# Patient Record
Sex: FEMALE | Race: OTHER | HISPANIC OR LATINO | URBAN - METROPOLITAN AREA
[De-identification: names, ages, dates, MRNs, and addresses within clinical notes are randomized per-mention and may not be internally consistent; named-entity substitution may affect disease eponyms.]

---

## 2018-10-10 ENCOUNTER — EMERGENCY (EMERGENCY)
Facility: HOSPITAL | Age: 53
LOS: 1 days | Discharge: HOME | End: 2018-10-10
Attending: EMERGENCY MEDICINE
Payer: MEDICAID

## 2018-10-10 VITALS
HEIGHT: 67 IN | WEIGHT: 199.96 LBS | DIASTOLIC BLOOD PRESSURE: 96 MMHG | OXYGEN SATURATION: 97 % | SYSTOLIC BLOOD PRESSURE: 142 MMHG | RESPIRATION RATE: 18 BRPM | TEMPERATURE: 98 F | HEART RATE: 72 BPM

## 2018-10-10 PROCEDURE — 99284 EMERGENCY DEPT VISIT MOD MDM: CPT

## 2018-10-10 RX ORDER — KETOROLAC TROMETHAMINE 30 MG/ML
30 SYRINGE (ML) INJECTION ONCE
Qty: 0 | Refills: 0 | Status: DISCONTINUED | OUTPATIENT
Start: 2018-10-10 | End: 2018-10-10

## 2018-10-10 NOTE — ED PROVIDER NOTE - OBJECTIVE STATEMENT
52 y/o F pt with a PMHx of HTN presents to the ED c/o L leg swelling and pain x 2 weeks. Pt reports that she fell on her L leg 2 weeks ago and it was initially bruised and swollen. She had an X-ray done shortly after her fall which she states was unremarkable. Pt says that her leg is no longer bruised but it remains swollen. She was sent her today from therapy to have an MRI done. She explains that her leg feels hotter, and more painful at night. Pt denies fever, chills, chest pain, shortness of breath or any other complaints. NKDA.

## 2018-10-10 NOTE — ED PROVIDER NOTE - MEDICAL DECISION MAKING DETAILS
54 y/o F pt presents with LLE swelling. Will check blood work, give pain medication then reassess. 52 y/o F pt presents with LLE swelling. Will check blood work, give pain medication then reassess.  labs are significant for elevated d-dimer. unable to get US at this time. given dose of lovenox in the ED and toradol with improvement of pain. Had detailed conversation with patient to come back to radiology suit for US tomorrow at 9am for US to confirm DVT and if positive to come back to the ED to see case management to get pt rx approved for blood thinner. pt agrees to this plan and says that she will come back to for US in the morning.

## 2018-10-10 NOTE — ED PROVIDER NOTE - MUSCULOSKELETAL MINIMAL EXAM
LLE with swelling and tenderness to palpation from knee to distal. Extremity is neurvascularly intact. No erythema, induration or skin changes. LLE with swelling and tenderness to palpation from knee to distal. Extremity is neurvascularly intact. No erythema, induration or skin changes. LLE N/V intact

## 2018-10-11 LAB
ALBUMIN SERPL ELPH-MCNC: 3.9 G/DL — SIGNIFICANT CHANGE UP (ref 3.5–5)
ALP SERPL-CCNC: 139 U/L — HIGH (ref 40–120)
ALT FLD-CCNC: 24 U/L DA — SIGNIFICANT CHANGE UP (ref 10–60)
ANION GAP SERPL CALC-SCNC: 6 MMOL/L — SIGNIFICANT CHANGE UP (ref 5–17)
APTT BLD: 30 SEC — SIGNIFICANT CHANGE UP (ref 27.5–37.4)
AST SERPL-CCNC: 25 U/L — SIGNIFICANT CHANGE UP (ref 10–40)
BASOPHILS # BLD AUTO: 0.1 K/UL — SIGNIFICANT CHANGE UP (ref 0–0.2)
BASOPHILS NFR BLD AUTO: 1.2 % — SIGNIFICANT CHANGE UP (ref 0–2)
BILIRUB SERPL-MCNC: 0.3 MG/DL — SIGNIFICANT CHANGE UP (ref 0.2–1.2)
BUN SERPL-MCNC: 19 MG/DL — HIGH (ref 7–18)
CALCIUM SERPL-MCNC: 8.7 MG/DL — SIGNIFICANT CHANGE UP (ref 8.4–10.5)
CHLORIDE SERPL-SCNC: 104 MMOL/L — SIGNIFICANT CHANGE UP (ref 96–108)
CO2 SERPL-SCNC: 28 MMOL/L — SIGNIFICANT CHANGE UP (ref 22–31)
CREAT SERPL-MCNC: 0.63 MG/DL — SIGNIFICANT CHANGE UP (ref 0.5–1.3)
D DIMER BLD IA.RAPID-MCNC: 1345 NG/ML DDU — HIGH
EOSINOPHIL # BLD AUTO: 0.2 K/UL — SIGNIFICANT CHANGE UP (ref 0–0.5)
EOSINOPHIL NFR BLD AUTO: 2.4 % — SIGNIFICANT CHANGE UP (ref 0–6)
GLUCOSE SERPL-MCNC: 92 MG/DL — SIGNIFICANT CHANGE UP (ref 70–99)
HCG SERPL-ACNC: 2 MIU/ML — SIGNIFICANT CHANGE UP
HCT VFR BLD CALC: 39.5 % — SIGNIFICANT CHANGE UP (ref 34.5–45)
HGB BLD-MCNC: 12.6 G/DL — SIGNIFICANT CHANGE UP (ref 11.5–15.5)
INR BLD: 0.96 RATIO — SIGNIFICANT CHANGE UP (ref 0.88–1.16)
LYMPHOCYTES # BLD AUTO: 2.7 K/UL — SIGNIFICANT CHANGE UP (ref 1–3.3)
LYMPHOCYTES # BLD AUTO: 36 % — SIGNIFICANT CHANGE UP (ref 13–44)
MCHC RBC-ENTMCNC: 27.3 PG — SIGNIFICANT CHANGE UP (ref 27–34)
MCHC RBC-ENTMCNC: 32 GM/DL — SIGNIFICANT CHANGE UP (ref 32–36)
MCV RBC AUTO: 85.4 FL — SIGNIFICANT CHANGE UP (ref 80–100)
MONOCYTES # BLD AUTO: 0.4 K/UL — SIGNIFICANT CHANGE UP (ref 0–0.9)
MONOCYTES NFR BLD AUTO: 5.5 % — SIGNIFICANT CHANGE UP (ref 2–14)
NEUTROPHILS # BLD AUTO: 4.1 K/UL — SIGNIFICANT CHANGE UP (ref 1.8–7.4)
NEUTROPHILS NFR BLD AUTO: 54.9 % — SIGNIFICANT CHANGE UP (ref 43–77)
PLATELET # BLD AUTO: 327 K/UL — SIGNIFICANT CHANGE UP (ref 150–400)
POTASSIUM SERPL-MCNC: 3.9 MMOL/L — SIGNIFICANT CHANGE UP (ref 3.5–5.3)
POTASSIUM SERPL-SCNC: 3.9 MMOL/L — SIGNIFICANT CHANGE UP (ref 3.5–5.3)
PROT SERPL-MCNC: 8.3 G/DL — SIGNIFICANT CHANGE UP (ref 6–8.3)
PROTHROM AB SERPL-ACNC: 10.4 SEC — SIGNIFICANT CHANGE UP (ref 9.8–12.7)
RBC # BLD: 4.63 M/UL — SIGNIFICANT CHANGE UP (ref 3.8–5.2)
RBC # FLD: 12.6 % — SIGNIFICANT CHANGE UP (ref 10.3–14.5)
SODIUM SERPL-SCNC: 138 MMOL/L — SIGNIFICANT CHANGE UP (ref 135–145)
WBC # BLD: 7.4 K/UL — SIGNIFICANT CHANGE UP (ref 3.8–10.5)
WBC # FLD AUTO: 7.4 K/UL — SIGNIFICANT CHANGE UP (ref 3.8–10.5)

## 2018-10-11 PROCEDURE — 96372 THER/PROPH/DIAG INJ SC/IM: CPT | Mod: XU

## 2018-10-11 PROCEDURE — 85730 THROMBOPLASTIN TIME PARTIAL: CPT

## 2018-10-11 PROCEDURE — 85379 FIBRIN DEGRADATION QUANT: CPT

## 2018-10-11 PROCEDURE — 84702 CHORIONIC GONADOTROPIN TEST: CPT

## 2018-10-11 PROCEDURE — 96374 THER/PROPH/DIAG INJ IV PUSH: CPT

## 2018-10-11 PROCEDURE — 93971 EXTREMITY STUDY: CPT

## 2018-10-11 PROCEDURE — 99284 EMERGENCY DEPT VISIT MOD MDM: CPT | Mod: 25

## 2018-10-11 PROCEDURE — 85610 PROTHROMBIN TIME: CPT

## 2018-10-11 PROCEDURE — 93971 EXTREMITY STUDY: CPT | Mod: 26,LT

## 2018-10-11 PROCEDURE — 85027 COMPLETE CBC AUTOMATED: CPT

## 2018-10-11 PROCEDURE — 80053 COMPREHEN METABOLIC PANEL: CPT

## 2018-10-11 RX ORDER — ENOXAPARIN SODIUM 100 MG/ML
90 INJECTION SUBCUTANEOUS ONCE
Qty: 0 | Refills: 0 | Status: COMPLETED | OUTPATIENT
Start: 2018-10-11 | End: 2018-10-11

## 2018-10-11 RX ADMIN — Medication 30 MILLIGRAM(S): at 00:31

## 2018-10-11 RX ADMIN — ENOXAPARIN SODIUM 90 MILLIGRAM(S): 100 INJECTION SUBCUTANEOUS at 02:16

## 2018-11-23 NOTE — ED ADULT TRIAGE NOTE - PAIN RATING/NUMBER SCALE (0-10): ACTIVITY
Regarding: Fever- 101.3  ----- Message from Vinicius Macedo sent at 11/22/2018  6:52 PM CST -----  Patient Name: Ember Davalos  Specialist or PCP:Dr. Lissy Siddiqui  Pregnant (If Yes, how long?):NO  Symptoms:Fever-101.3  Call Back #:[phone]  Is the patient’s permanent residence located in [place], [place], or a [facility]? Yes [facility] [address]  Call Center Account #:[id number]       6

## 2019-02-12 NOTE — ED PROVIDER NOTE - CHPI ED SYMPTOMS POS
Patient Instructions by Vern Rodríguez MD at 06/14/17 01:22 PM     Author:  Vern Rodríguez MD Service:  (none) Author Type:  Physician     Filed:  06/14/17 01:24 PM Encounter Date:  6/14/2017 Status:  Signed     :  Vern Rodríguez MD (Physician)            Sinusitis      Sinusitis is a swelling of the lining of the sinuses which is sometimes caused by a bacterial infection.  The sinuses are hollow spaces in the bones of your face.  The sinuses are connected to and drain into the nose.      Acute sinusitis lasts for less than four weeks and chronic sinusitis last for more than 12 weeks.     The most common cause of sinusitis is a virus, such as the common cold.  When you catch a cold the mucus becomes thick and sticky, and doesn't drain well. Bacteria can grow in the mucus trapped in your sinuses.  This can lead to a bacterial sinus infection.    People who suffer with allergies, hay fever, asthma, or who smoke or have a deviated septum or nasal polyp are at increased risk of sinusitis.      Antibiotics can treat bacterial infections, but not viral infections. Having a green or yellow nasal discharge does not necessarily mean you need an antibiotic.  Your doctor may have prescribed and antibiotic if your evaluation indicated a probable bacterial infection.     When appropriate (consider age, medical conditions, current medications and symptoms) decongestants, pain relievers and fever reducers (Tylenol and Motrin for example) and expectorants (Mucinex) can be tried. Use saltwater nasal spray to loosen the mucous.  If antibiotics are used but the symptoms are not improving within 5-7 days you please call  your PCP      Doxycycline as ordered-GERD like side effects discussed and to take 1 hr before or 2 hrs after meals and avoid all intake of calcium near time of pill ingestion. Warned re photosensitivity-use sunscreen and cover up during duration of treatment when out in sun.    Albuterol 2 puffs 4 times  a day for 7 days.      Benzonatate 200mg tid PRN cough. Warned to keep this med away from children or pets because the pills could eaten  and should not be sucked on or chewed.  I recommended that the pills be taken over the sink so that if they fall, it will go down the drain instead of being found by a child or pet.     Patient should immediately recap the bottle.      Diflucan for expectant vaginitis.     Prednisone as ordered. Typical side effects are abdominal pain,irritability, or headache.Stop steroids if significant symptoms and see PCP as soon as possible.     For typical viral illness expect on average 3-4 days of sore throat and fever and 7-10 days of cough.     TO ER if worse with fever, chest pain,  and/or shortness of breath    Additional Educational Resources:  For additional resources regarding your symptoms, diagnosis, or further health information, please visit the Health Resources section on [email or web address] or the Online Health Resources section in [employer].        Revision History        User Key Date/Time User Provider Type Action    > [N/A] 06/14/17 01:24 PM Vern Rodríguez MD Physician Sign             EDEMA/PAIN

## 2020-11-04 ENCOUNTER — INPATIENT (INPATIENT)
Facility: HOSPITAL | Age: 55
LOS: 1 days | Discharge: HOME | DRG: 313 | End: 2020-11-06
Attending: INTERNAL MEDICINE | Admitting: INTERNAL MEDICINE
Payer: MEDICAID

## 2020-11-04 VITALS
DIASTOLIC BLOOD PRESSURE: 88 MMHG | TEMPERATURE: 98 F | RESPIRATION RATE: 18 BRPM | WEIGHT: 205.03 LBS | HEIGHT: 67 IN | OXYGEN SATURATION: 99 % | HEART RATE: 104 BPM | SYSTOLIC BLOOD PRESSURE: 126 MMHG

## 2020-11-04 DIAGNOSIS — R07.9 CHEST PAIN, UNSPECIFIED: ICD-10-CM

## 2020-11-04 PROBLEM — I10 ESSENTIAL (PRIMARY) HYPERTENSION: Chronic | Status: ACTIVE | Noted: 2018-10-11

## 2020-11-04 LAB
ALBUMIN SERPL ELPH-MCNC: 3.8 G/DL — SIGNIFICANT CHANGE UP (ref 3.5–5)
ALP SERPL-CCNC: 178 U/L — HIGH (ref 40–120)
ALT FLD-CCNC: 125 U/L DA — HIGH (ref 10–60)
ANION GAP SERPL CALC-SCNC: 7 MMOL/L — SIGNIFICANT CHANGE UP (ref 5–17)
APPEARANCE UR: CLEAR — SIGNIFICANT CHANGE UP
APTT BLD: 30.9 SEC — SIGNIFICANT CHANGE UP (ref 27.5–35.5)
AST SERPL-CCNC: 49 U/L — HIGH (ref 10–40)
BASOPHILS # BLD AUTO: 0.02 K/UL — SIGNIFICANT CHANGE UP (ref 0–0.2)
BASOPHILS NFR BLD AUTO: 0.2 % — SIGNIFICANT CHANGE UP (ref 0–2)
BILIRUB SERPL-MCNC: 0.5 MG/DL — SIGNIFICANT CHANGE UP (ref 0.2–1.2)
BILIRUB UR-MCNC: NEGATIVE — SIGNIFICANT CHANGE UP
BUN SERPL-MCNC: 17 MG/DL — SIGNIFICANT CHANGE UP (ref 7–18)
CALCIUM SERPL-MCNC: 9.4 MG/DL — SIGNIFICANT CHANGE UP (ref 8.4–10.5)
CHLORIDE SERPL-SCNC: 101 MMOL/L — SIGNIFICANT CHANGE UP (ref 96–108)
CO2 SERPL-SCNC: 29 MMOL/L — SIGNIFICANT CHANGE UP (ref 22–31)
COLOR SPEC: YELLOW — SIGNIFICANT CHANGE UP
CREAT SERPL-MCNC: 0.84 MG/DL — SIGNIFICANT CHANGE UP (ref 0.5–1.3)
DIFF PNL FLD: ABNORMAL
EOSINOPHIL # BLD AUTO: 0.12 K/UL — SIGNIFICANT CHANGE UP (ref 0–0.5)
EOSINOPHIL NFR BLD AUTO: 1.2 % — SIGNIFICANT CHANGE UP (ref 0–6)
ETHANOL SERPL-MCNC: <3 MG/DL — SIGNIFICANT CHANGE UP (ref 0–10)
GLUCOSE SERPL-MCNC: 87 MG/DL — SIGNIFICANT CHANGE UP (ref 70–99)
GLUCOSE UR QL: NEGATIVE — SIGNIFICANT CHANGE UP
HCG SERPL-ACNC: 2 MIU/ML — SIGNIFICANT CHANGE UP
HCG UR QL: NEGATIVE — SIGNIFICANT CHANGE UP
HCT VFR BLD CALC: 41.5 % — SIGNIFICANT CHANGE UP (ref 34.5–45)
HGB BLD-MCNC: 13.4 G/DL — SIGNIFICANT CHANGE UP (ref 11.5–15.5)
IMM GRANULOCYTES NFR BLD AUTO: 0.5 % — SIGNIFICANT CHANGE UP (ref 0–1.5)
INR BLD: 1.09 RATIO — SIGNIFICANT CHANGE UP (ref 0.88–1.16)
KETONES UR-MCNC: NEGATIVE — SIGNIFICANT CHANGE UP
LACTATE SERPL-SCNC: 2.1 MMOL/L — HIGH (ref 0.7–2)
LEUKOCYTE ESTERASE UR-ACNC: ABNORMAL
LYMPHOCYTES # BLD AUTO: 2.07 K/UL — SIGNIFICANT CHANGE UP (ref 1–3.3)
LYMPHOCYTES # BLD AUTO: 21.4 % — SIGNIFICANT CHANGE UP (ref 13–44)
MAGNESIUM SERPL-MCNC: 2.5 MG/DL — SIGNIFICANT CHANGE UP (ref 1.6–2.6)
MCHC RBC-ENTMCNC: 27.5 PG — SIGNIFICANT CHANGE UP (ref 27–34)
MCHC RBC-ENTMCNC: 32.3 GM/DL — SIGNIFICANT CHANGE UP (ref 32–36)
MCV RBC AUTO: 85 FL — SIGNIFICANT CHANGE UP (ref 80–100)
MONOCYTES # BLD AUTO: 0.69 K/UL — SIGNIFICANT CHANGE UP (ref 0–0.9)
MONOCYTES NFR BLD AUTO: 7.1 % — SIGNIFICANT CHANGE UP (ref 2–14)
NEUTROPHILS # BLD AUTO: 6.71 K/UL — SIGNIFICANT CHANGE UP (ref 1.8–7.4)
NEUTROPHILS NFR BLD AUTO: 69.6 % — SIGNIFICANT CHANGE UP (ref 43–77)
NITRITE UR-MCNC: NEGATIVE — SIGNIFICANT CHANGE UP
NRBC # BLD: 0 /100 WBCS — SIGNIFICANT CHANGE UP (ref 0–0)
NT-PROBNP SERPL-SCNC: 17 PG/ML — SIGNIFICANT CHANGE UP (ref 0–125)
PH UR: 6 — SIGNIFICANT CHANGE UP (ref 5–8)
PLATELET # BLD AUTO: 280 K/UL — SIGNIFICANT CHANGE UP (ref 150–400)
POTASSIUM SERPL-MCNC: 3.8 MMOL/L — SIGNIFICANT CHANGE UP (ref 3.5–5.3)
POTASSIUM SERPL-SCNC: 3.8 MMOL/L — SIGNIFICANT CHANGE UP (ref 3.5–5.3)
PROT SERPL-MCNC: 8.2 G/DL — SIGNIFICANT CHANGE UP (ref 6–8.3)
PROT UR-MCNC: NEGATIVE — SIGNIFICANT CHANGE UP
PROTHROM AB SERPL-ACNC: 12.9 SEC — SIGNIFICANT CHANGE UP (ref 10.6–13.6)
RBC # BLD: 4.88 M/UL — SIGNIFICANT CHANGE UP (ref 3.8–5.2)
RBC # FLD: 13.3 % — SIGNIFICANT CHANGE UP (ref 10.3–14.5)
SARS-COV-2 RNA SPEC QL NAA+PROBE: SIGNIFICANT CHANGE UP
SODIUM SERPL-SCNC: 137 MMOL/L — SIGNIFICANT CHANGE UP (ref 135–145)
SP GR SPEC: 1.01 — SIGNIFICANT CHANGE UP (ref 1.01–1.02)
TROPONIN I SERPL-MCNC: <0.015 NG/ML — SIGNIFICANT CHANGE UP (ref 0–0.04)
UROBILINOGEN FLD QL: NEGATIVE — SIGNIFICANT CHANGE UP
WBC # BLD: 9.66 K/UL — SIGNIFICANT CHANGE UP (ref 3.8–10.5)
WBC # FLD AUTO: 9.66 K/UL — SIGNIFICANT CHANGE UP (ref 3.8–10.5)

## 2020-11-04 PROCEDURE — 70450 CT HEAD/BRAIN W/O DYE: CPT | Mod: 26

## 2020-11-04 PROCEDURE — 71046 X-RAY EXAM CHEST 2 VIEWS: CPT | Mod: 26

## 2020-11-04 PROCEDURE — 99285 EMERGENCY DEPT VISIT HI MDM: CPT

## 2020-11-04 RX ORDER — ASPIRIN/CALCIUM CARB/MAGNESIUM 324 MG
81 TABLET ORAL DAILY
Refills: 0 | Status: DISCONTINUED | OUTPATIENT
Start: 2020-11-04 | End: 2020-11-06

## 2020-11-04 RX ORDER — ASPIRIN/CALCIUM CARB/MAGNESIUM 324 MG
325 TABLET ORAL ONCE
Refills: 0 | Status: COMPLETED | OUTPATIENT
Start: 2020-11-04 | End: 2020-11-04

## 2020-11-04 RX ORDER — ATORVASTATIN CALCIUM 80 MG/1
80 TABLET, FILM COATED ORAL AT BEDTIME
Refills: 0 | Status: DISCONTINUED | OUTPATIENT
Start: 2020-11-04 | End: 2020-11-06

## 2020-11-04 RX ORDER — ACETAMINOPHEN 500 MG
650 TABLET ORAL EVERY 6 HOURS
Refills: 0 | Status: DISCONTINUED | OUTPATIENT
Start: 2020-11-04 | End: 2020-11-06

## 2020-11-04 RX ORDER — METOPROLOL TARTRATE 50 MG
12.5 TABLET ORAL
Refills: 0 | Status: DISCONTINUED | OUTPATIENT
Start: 2020-11-04 | End: 2020-11-06

## 2020-11-04 RX ORDER — SODIUM CHLORIDE 9 MG/ML
1000 INJECTION INTRAMUSCULAR; INTRAVENOUS; SUBCUTANEOUS ONCE
Refills: 0 | Status: COMPLETED | OUTPATIENT
Start: 2020-11-04 | End: 2020-11-04

## 2020-11-04 RX ADMIN — Medication 325 MILLIGRAM(S): at 17:40

## 2020-11-04 RX ADMIN — Medication 650 MILLIGRAM(S): at 23:40

## 2020-11-04 RX ADMIN — SODIUM CHLORIDE 1000 MILLILITER(S): 9 INJECTION INTRAMUSCULAR; INTRAVENOUS; SUBCUTANEOUS at 17:50

## 2020-11-04 NOTE — H&P ADULT - PROBLEM SELECTOR PLAN 3
pt says she takes losartan at home  primary team to obtain meds from pharmacy in am  bp WNL for now  started on low dose lopressor  monitor bp

## 2020-11-04 NOTE — H&P ADULT - NSICDXPASTMEDICALHX_GEN_ALL_CORE_FT
PAST MEDICAL HISTORY:  Cerebrovascular accident (CVA)     HTN (hypertension)     Swelling of lower extremity

## 2020-11-04 NOTE — ED PROVIDER NOTE - CLINICAL SUMMARY MEDICAL DECISION MAKING FREE TEXT BOX
Character low suspicion for CVA and no current blurry vision. NIH 0 and out of window and not a candidate for TPA or NI. Character low suspicion for dissection and no murmur or pulse asymmetry. No arrythmia. Character low suspicion for PE and no risk factors for this or hypoxia or e/o DVT. No e/o fluid overload, PTX, or PNA on exam or CXR. Character low suspicion for ACS and ECG/trop _______________, will admit for ACS w/u and concern for TIA. Character low suspicion for CVA and no current blurry vision. NIH 0 and out of window and not a candidate for TPA or NI. Character low suspicion for dissection and no murmur or pulse asymmetry. No arrythmia. Character low suspicion for PE and no risk factors for this or hypoxia or e/o DVT. No e/o fluid overload, PTX, or PNA on exam or CXR. Character low suspicion for ACS and ECG/trop unremarkable, will admit for ACS w/u and concern for TIA. Patient well appearing, hemodynamically stable. Given ASA, fluids. Admitted to internal medicine for further monitoring, w/u, and care.

## 2020-11-04 NOTE — H&P ADULT - NSHPPHYSICALEXAM_GEN_ALL_CORE
General - NAD, sitting up in bed, well groomed  Eyes - PERRLA, EOM intact  ENT - Nonicteric sclerae, PERRLA, EOMI. Oropharynx clear. Moist mucous membranes. Conjunctivae appear well perfused.   Neck - No noticeable or palpable swelling, redness or rash around throat or on face  Lymph Nodes - No lymphadenopathy  Cardiovascular - RRR no m/r/g, no JVD, no carotid bruits  Lungs - Clear to auscltation, no use of acessory muscles, no crackles or wheezes.  Skin - No rashes, skin warm and dry, no erythematous areas  Breast -  Psychiatry -  Abdomen - Normal bowel sounds, abdomen soft and nontender  Genito Urinary – Genital exam not performed since complaints not related.  Rectal – Rectal exam not performed since no symptoms indicated blood loss.  Extremeties - No edema, cyanosis or clubbing  Musculo Skeletal - 5/5 strength, normal range of motion, no swollen or erythematous  joints.  Neurological – Alert and oriented x 3, CN 2-12 grossly intact. General - NAD, sitting up in bed, well groomed  Eyes - PERRLA, EOM intact  ENT - Nonicteric sclerae, PERRLA, EOMI. Oropharynx clear. Moist mucous membranes. Conjunctivae appear well perfused.   Neck - No noticeable or palpable swelling, redness or rash around throat or on face  Lymph Nodes - No lymphadenopathy  Cardiovascular - RRR no m/r/g, no JVD, no carotid bruits  Lungs - Clear to auscultation, no use of accessory muscles, no crackles or wheezes.  Skin - No rashes, skin warm and dry, no erythematous areas  Abdomen - Normal bowel sounds, abdomen soft and nontender  Extremities - LE swelling  Musculo Skeletal -decreased strength   Neurological – Alert and oriented x 3, CN 2-12 grossly intact.

## 2020-11-04 NOTE — ED PROVIDER NOTE - OBJECTIVE STATEMENT
[id number].  55yoF with h/o HTN, ?CVA, presents with CP, SOB, blurry vision. States 2 days ago she began having nonradiating L sided chest pain when she bent over, now today has been constant, improved when she breathes deeply, no other exac/alleviating factors including exertion. Associated constant nonexertional SOB this entire time as well as well as feeling her fingertips are rigid. Also reports 2 25-minute episodes of blurry vision during this time. She reports having a CVA 6 yrs ago consisting of blurry vision and SOB, triggered by an argument she was having, and was admitted to the hospital and told she had a stroke and HTN. States she thinks the past 2 days have been triggered by seeing something at work that she has been thinking about a lot.  [id number].  55yoF with h/o HTN, ?CVA, presents with CP, SOB, blurry vision. States 2 days ago she began having nonradiating L sided chest pain when she bent over, now today has been constant, improved when she breathes deeply, no other exac/alleviating factors including exertion. Associated constant nonexertional SOB this entire time as well as well as feeling her fingertips are rigid. Also reports 2 25-minute episodes of blurry vision during this time. She reports having a CVA 6 yrs ago consisting of blurry vision and SOB, triggered by an argument she was having, and was admitted to the hospital and told she had a stroke and HTN. States she thinks the past 2 days have been triggered by seeing something at work that she has been thinking about a lot. +FHx of ACS at a younger age.

## 2020-11-04 NOTE — ED PROVIDER NOTE - CARE PLAN
Principal Discharge DX:	Chest pain, rule out acute myocardial infarction  Secondary Diagnosis:	Blurry vision

## 2020-11-04 NOTE — ED ADULT NURSE NOTE - ED STAT RN HANDOFF DETAILS
Pt is alert and oriented x3. No neuro deficit noted. No sign of acute distress noted. Cardiac monitor in progress. NSR noted. Pt is transferred to holding in stable condition. Pt is alert and oriented x3. No neuro deficit noted. No sign of acute distress noted. Cardiac monitor in progress. NSR noted. Pérez leg edema noted. Pt is transferred to holding in stable condition.

## 2020-11-04 NOTE — H&P ADULT - HISTORY OF PRESENT ILLNESS
Patient, a 55 year old female from home, with PMH of HTN, stroke 6 years ago and CHF? presents to the ED with complaint of chest pain, shortness of breath and blurry vision. Patient complains of left sided chest pain that started yesterday, throbbing, initially 10 now improved to 7 in intensity after managed by ED, radiating to left shoulder. She took no medicines for the pain. She also complaints of SOB since yesterday that is present at rest and exertion. She can walk 3 blocks without getting short of breath and it is more when using stairs. Endorses orthopnea that improves with pillows. Denies fever, chills, cough, palpitations. Pt has also been having blurry vision which she describes as 'seeing crystals'. She had one episode yesterday lasting 30min and then today for 20-25min. This is associated with headache and dizziness but no blacking out or LOC.  Denies diarrhea, constipation, urinary issues, recent travel history.  She also reports swelling of feet and nausea. Pt states her fingers feel numb and weak.     In ED, pt tachycardiac to 104 on admission. EKG showed NSR. She received 325mg aspirin and 1L NS bolus.  Vital Signs Last 24 Hrs  T(C): 37 (04 Nov 2020 23:38), Max: 37 (04 Nov 2020 23:38)  T(F): 98.6 (04 Nov 2020 23:38), Max: 98.6 (04 Nov 2020 23:38)  HR: 78 (04 Nov 2020 23:38) (78 - 104)  BP: 101/60 (04 Nov 2020 23:38) (101/60 - 126/88)  BP(mean): --  RR: 18 (04 Nov 2020 23:38) (17 - 18)  SpO2: 98% (04 Nov 2020 23:38) (98% - 99%)

## 2020-11-04 NOTE — H&P ADULT - ASSESSMENT
Patient, a 55 year old female from home, with PMH of HTN, stroke 6 years ago and CHF? presents to the ED with complaint of chest pain, shortness of breath and blurry vision since yesterday. EKG showed NSR. T1 negative. CTH showed no acute hemorrhage, mass effect or extra-axial collection, dense bilateral globus pallidus calcification. CXR negative. Lactate 2.1.     Patient admitted to tele for evaluation of chest pain and stroke workup.       Patient does not remember her medications. Pharmacy closed. Morning team to obtain medications from [facility] [phone]. (number placed in chart)

## 2020-11-04 NOTE — ED ADULT NURSE NOTE - OBJECTIVE STATEMENT
pt presents c/o L sided cp, states 2 days ago she began having nonradiating L sided chest pain when she bent over, now today has been constant, improved when she breathes deeply, no other exac/alleviating factors including exertion. Associated constant nonexertional SOB this entire time as well as well as feeling her fingertips are rigid. Also reports 2 25-minute episodes of blurry vision during this time. Denies fever, chills, palpitations, BLE swelling, HA, dizziness, weakness, slurred speech, facial droop.

## 2020-11-04 NOTE — H&P ADULT - PROBLEM SELECTOR PLAN 2
pt also p/w blurry vision episodes along with dizziness  NIHSS 0   pt has h/o stroke so r/o stroke  CTH shows dense bilateral globus pallidus calcification.   start on aspirin and statin   will get neuro eval  f/u echo  f/u carotid doppler  will also get orthostatics

## 2020-11-04 NOTE — H&P ADULT - PROBLEM SELECTOR PLAN 7
[ ] Previous VTE                                    3  [ ] Thrombophilia                                  2  [ ] Lower limb paralysis                        2  (unable to hold up >15 seconds)    [ ] Current Cancer (within 6 months)        2   [x] Immobilization > 24 hrs                    1  [ ] ICU/CCU stay > 24 hrs                      1  [ ] Age > 60                                         1    VTE score:1  start lovenox ppx

## 2020-11-04 NOTE — ED ADULT TRIAGE NOTE - PATIENT/CAREGIVER OFFERED  INTERPRETER SERVICES
Subjective:       Patient ID: Barbara Mims is a 61 y.o. female.    Chief Complaint: Follow-up    Mrs. Mims is a 60 year old female who presents to clinic for follow up on psoriatic arthritis and pain management. She took otezla and it caused leg cramps, new onset dermatitis, looks like open lesions do n ot itch and arms, abd, legs she not been bit,  Stays inside,   And has dogs but are both inside  Dogs without fleas.  Current treatment:  1. Norco 10/325 TID PRN for pain  2. OTC ibuprofen 400 mg daily  3. Prednisone 10 mg daily  4. Gabapentin 300 mg nightly    Review of Systems   Constitutional: Positive for activity change. Negative for appetite change, chills, fatigue and fever.   HENT: Positive for postnasal drip and rhinorrhea.    Eyes: Negative for visual disturbance.   Respiratory: Negative for cough and shortness of breath.    Cardiovascular: Negative for chest pain, palpitations and leg swelling.   Gastrointestinal: Negative for abdominal pain, constipation, diarrhea, nausea and vomiting.   Musculoskeletal: Positive for arthralgias, back pain, joint swelling, myalgias, neck pain and neck stiffness.   Skin: Positive for rash.        Open lesions erythema   Allergic/Immunologic: Positive for environmental allergies.   Neurological: Negative for dizziness, weakness, light-headedness and headaches.         Objective:     Vitals:    05/22/19 1445   BP: (!) 143/97   Pulse: 82       Past Medical History:   Diagnosis Date    Alopecia     Anemia     Anxiety     Arthritis     Cardiac angina syndrome     Chronic kidney disease     kidney stones    Degenerative disc disease     Depression     Dry eyes     Dry mouth     Hyperlipidemia     Hypertension     Kidney stones     Mitral valve prolapse     Thyroid disease      Past Surgical History:   Procedure Laterality Date    CARDIAC CATHETERIZATION      CERVICAL DISC SURGERY      HYSTERECTOMY      LITHOTRIPSY            Physical Exam    Constitutional: She is well-developed, well-nourished, and in no distress.   Eyes: Right conjunctiva is not injected. Left conjunctiva is not injected. Right eye exhibits normal extraocular motion. Left eye exhibits normal extraocular motion.   Neck: No JVD present. No thyromegaly present.   Cardiovascular: Normal rate and regular rhythm.  Exam reveals no decreased pulses.    Pulmonary/Chest: She has no wheezes. She has no rhonchi. She has no rales.       Right Side Rheumatological Exam     Examination finds the shoulder, elbow, wrist, knee, 1st MCP, 2nd MCP, 3rd MCP, 4th MCP and 5th MCP normal.    The patient is tender to palpation of the 1st PIP, 2nd PIP, 3rd PIP, 4th PIP and 5th PIP    She has swelling of the 1st PIP, 2nd PIP, 3rd PIP, 4th PIP and 5th PIP    Left Side Rheumatological Exam     Examination finds the shoulder, elbow, wrist, knee, 1st MCP, 2nd MCP, 3rd MCP, 4th MCP and 5th MCP normal.    The patient is tender to palpation of the 1st PIP, 2nd PIP, 3rd PIP, 4th PIP and 5th PIP.    She has swelling of the 1st PIP, 2nd PIP, 3rd PIP, 4th PIP and 5th PIP      Back/Neck Exam   Tenderness Right paramedian tenderness of the Lower C-Spine and Upper T-Spine.Left paramedian tenderness of the Lower C-Spine and Upper T-Spine.    Neck Range of Motion   Flexion: Limited  Extension: Limited  Right Lateral Bend: abnormal  Left Lateral Bend: abnormal  Right Rotation: abnormal  Left Rotation: abnormal  Lymphadenopathy:     She has no cervical adenopathy.   Neurological: Gait normal.   Skin: No rash noted.     +psoriatic nail changes   Psychiatric: Mood and affect normal.   Musculoskeletal: She exhibits tenderness and deformity.           Results for orders placed or performed in visit on 05/22/19   Sedimentation rate   Result Value Ref Range    Sed Rate 14 0 - 20 mm/Hr         Assessment:       1. Bronchitis    2. Psoriatic arthritis    3. Anxiety disorder, unspecified type    4. Depression, unspecified depression  type    5. Fibromyalgia    6. Raynaud's disease without gangrene    7. Thyroiditis    8. Psoriasis with pustules    9. Weight loss            Plan:       Bronchitis  -     X-Ray Chest PA And Lateral; Future; Expected date: 05/22/2019    Psoriatic arthritis  -     gabapentin (NEURONTIN) 300 MG capsule; Take 1 capsule (300 mg total) by mouth nightly.  Dispense: 30 capsule; Refill: 6  -     ergocalciferol (ERGOCALCIFEROL) 50,000 unit Cap; Take 1 capsule (50,000 Units total) by mouth every 7 days.  Dispense: 4 capsule; Refill: 6  -     levothyroxine (SYNTHROID) 50 MCG tablet; Take 1 tablet (50 mcg total) by mouth before breakfast.  Dispense: 30 tablet; Refill: 6  -     predniSONE (DELTASONE) 5 MG tablet; Take 2 tablets (10 mg total) by mouth daily as needed.  Dispense: 60 tablet; Refill: 6  -     QUEtiapine (SEROQUEL) 50 MG tablet; Take 3 tablets (150 mg total) by mouth every evening.  Dispense: 90 tablet; Refill: 6  -     Discontinue: HYDROcodone-acetaminophen (NORCO)  mg per tablet; Take 1 tablet by mouth every 8 (eight) hours as needed for Pain.  Dispense: 90 tablet; Refill: 0  -     X-Ray Chest PA And Lateral; Future; Expected date: 05/22/2019  -     Discontinue: HYDROcodone-acetaminophen (NORCO)  mg per tablet; Take 1 tablet by mouth every 8 (eight) hours as needed for Pain.  Dispense: 90 tablet; Refill: 0  -     HYDROcodone-acetaminophen (NORCO)  mg per tablet; Take 1 tablet by mouth every 8 (eight) hours as needed for Pain.  Dispense: 90 tablet; Refill: 0    Anxiety disorder, unspecified type  -     gabapentin (NEURONTIN) 300 MG capsule; Take 1 capsule (300 mg total) by mouth nightly.  Dispense: 30 capsule; Refill: 6  -     ergocalciferol (ERGOCALCIFEROL) 50,000 unit Cap; Take 1 capsule (50,000 Units total) by mouth every 7 days.  Dispense: 4 capsule; Refill: 6  -     levothyroxine (SYNTHROID) 50 MCG tablet; Take 1 tablet (50 mcg total) by mouth before breakfast.  Dispense: 30 tablet; Refill:  6  -     predniSONE (DELTASONE) 5 MG tablet; Take 2 tablets (10 mg total) by mouth daily as needed.  Dispense: 60 tablet; Refill: 6  -     QUEtiapine (SEROQUEL) 50 MG tablet; Take 3 tablets (150 mg total) by mouth every evening.  Dispense: 90 tablet; Refill: 6  -     X-Ray Chest PA And Lateral; Future; Expected date: 05/22/2019    Depression, unspecified depression type  -     gabapentin (NEURONTIN) 300 MG capsule; Take 1 capsule (300 mg total) by mouth nightly.  Dispense: 30 capsule; Refill: 6  -     ergocalciferol (ERGOCALCIFEROL) 50,000 unit Cap; Take 1 capsule (50,000 Units total) by mouth every 7 days.  Dispense: 4 capsule; Refill: 6  -     levothyroxine (SYNTHROID) 50 MCG tablet; Take 1 tablet (50 mcg total) by mouth before breakfast.  Dispense: 30 tablet; Refill: 6  -     predniSONE (DELTASONE) 5 MG tablet; Take 2 tablets (10 mg total) by mouth daily as needed.  Dispense: 60 tablet; Refill: 6  -     QUEtiapine (SEROQUEL) 50 MG tablet; Take 3 tablets (150 mg total) by mouth every evening.  Dispense: 90 tablet; Refill: 6  -     X-Ray Chest PA And Lateral; Future; Expected date: 05/22/2019    Fibromyalgia  -     gabapentin (NEURONTIN) 300 MG capsule; Take 1 capsule (300 mg total) by mouth nightly.  Dispense: 30 capsule; Refill: 6  -     ergocalciferol (ERGOCALCIFEROL) 50,000 unit Cap; Take 1 capsule (50,000 Units total) by mouth every 7 days.  Dispense: 4 capsule; Refill: 6  -     levothyroxine (SYNTHROID) 50 MCG tablet; Take 1 tablet (50 mcg total) by mouth before breakfast.  Dispense: 30 tablet; Refill: 6  -     predniSONE (DELTASONE) 5 MG tablet; Take 2 tablets (10 mg total) by mouth daily as needed.  Dispense: 60 tablet; Refill: 6  -     QUEtiapine (SEROQUEL) 50 MG tablet; Take 3 tablets (150 mg total) by mouth every evening.  Dispense: 90 tablet; Refill: 6  -     X-Ray Chest PA And Lateral; Future; Expected date: 05/22/2019    Raynaud's disease without gangrene  -     gabapentin (NEURONTIN) 300 MG capsule;  Take 1 capsule (300 mg total) by mouth nightly.  Dispense: 30 capsule; Refill: 6  -     ergocalciferol (ERGOCALCIFEROL) 50,000 unit Cap; Take 1 capsule (50,000 Units total) by mouth every 7 days.  Dispense: 4 capsule; Refill: 6  -     levothyroxine (SYNTHROID) 50 MCG tablet; Take 1 tablet (50 mcg total) by mouth before breakfast.  Dispense: 30 tablet; Refill: 6  -     predniSONE (DELTASONE) 5 MG tablet; Take 2 tablets (10 mg total) by mouth daily as needed.  Dispense: 60 tablet; Refill: 6  -     QUEtiapine (SEROQUEL) 50 MG tablet; Take 3 tablets (150 mg total) by mouth every evening.  Dispense: 90 tablet; Refill: 6  -     X-Ray Chest PA And Lateral; Future; Expected date: 05/22/2019    Thyroiditis  -     gabapentin (NEURONTIN) 300 MG capsule; Take 1 capsule (300 mg total) by mouth nightly.  Dispense: 30 capsule; Refill: 6  -     ergocalciferol (ERGOCALCIFEROL) 50,000 unit Cap; Take 1 capsule (50,000 Units total) by mouth every 7 days.  Dispense: 4 capsule; Refill: 6  -     levothyroxine (SYNTHROID) 50 MCG tablet; Take 1 tablet (50 mcg total) by mouth before breakfast.  Dispense: 30 tablet; Refill: 6  -     predniSONE (DELTASONE) 5 MG tablet; Take 2 tablets (10 mg total) by mouth daily as needed.  Dispense: 60 tablet; Refill: 6  -     QUEtiapine (SEROQUEL) 50 MG tablet; Take 3 tablets (150 mg total) by mouth every evening.  Dispense: 90 tablet; Refill: 6  -     X-Ray Chest PA And Lateral; Future; Expected date: 05/22/2019    Psoriasis with pustules  -     gabapentin (NEURONTIN) 300 MG capsule; Take 1 capsule (300 mg total) by mouth nightly.  Dispense: 30 capsule; Refill: 6  -     ergocalciferol (ERGOCALCIFEROL) 50,000 unit Cap; Take 1 capsule (50,000 Units total) by mouth every 7 days.  Dispense: 4 capsule; Refill: 6  -     levothyroxine (SYNTHROID) 50 MCG tablet; Take 1 tablet (50 mcg total) by mouth before breakfast.  Dispense: 30 tablet; Refill: 6  -     predniSONE (DELTASONE) 5 MG tablet; Take 2 tablets (10 mg  total) by mouth daily as needed.  Dispense: 60 tablet; Refill: 6  -     QUEtiapine (SEROQUEL) 50 MG tablet; Take 3 tablets (150 mg total) by mouth every evening.  Dispense: 90 tablet; Refill: 6  -     X-Ray Chest PA And Lateral; Future; Expected date: 05/22/2019    Weight loss  -     gabapentin (NEURONTIN) 300 MG capsule; Take 1 capsule (300 mg total) by mouth nightly.  Dispense: 30 capsule; Refill: 6  -     ergocalciferol (ERGOCALCIFEROL) 50,000 unit Cap; Take 1 capsule (50,000 Units total) by mouth every 7 days.  Dispense: 4 capsule; Refill: 6  -     levothyroxine (SYNTHROID) 50 MCG tablet; Take 1 tablet (50 mcg total) by mouth before breakfast.  Dispense: 30 tablet; Refill: 6  -     predniSONE (DELTASONE) 5 MG tablet; Take 2 tablets (10 mg total) by mouth daily as needed.  Dispense: 60 tablet; Refill: 6  -     X-Ray Chest PA And Lateral; Future; Expected date: 05/22/2019    Other orders  -     levoFLOXacin (LEVAQUIN) 500 MG tablet; Take 1 tablet (500 mg total) by mouth once daily. for 10 days  Dispense: 10 tablet; Refill: 0  -     clobetasol (TEMOVATE) 0.05 % cream; Apply topically 2 (two) times daily.  Dispense: 60 g; Refill: 5  -     albuterol (VENTOLIN HFA) 90 mcg/actuation inhaler; Inhale 2 puffs into the lungs every 6 (six) hours as needed for Wheezing. Rescue  Dispense: 18 g; Refill: 0         consider xeljanz   yes

## 2020-11-04 NOTE — H&P ADULT - PROBLEM SELECTOR PLAN 1
p/w chest pain and dyspnea  EKG showed NSR  Trop neg x 2   f/u echo  admit to tele  Cardio consult Dr Demarco

## 2020-11-04 NOTE — ED PROVIDER NOTE - PHYSICAL EXAMINATION
Afebrile, hemodynamically stable, saturating well on RA  NAD, nontoxic appearing, anxious, no WOB, speaking full sentences  Head NCAT  Pupils equal, EOMI, anicteric  MMM  No JVD  RRR, nml S1/S2, no m/r/g  Lungs CTAB, no w/r/r  Abd soft, NT, ND, nml BS, no rebound or guarding  AAO, CN's 3-12 intact, motor 5/5 and sens symm in all extrems, F-N nml  DAVIS spontaneously, no leg cyanosis or edema  Skin warm, well perfused, no rashes or hives

## 2020-11-04 NOTE — H&P ADULT - ATTENDING COMMENTS
55yoF with h/o HTN, ?CVA, presents with CP, SOB, blurry vision. States 2 days ago she began having nonradiating L sided chest pain when she bent over, now today has been constant, improved when she breathes deeply, no other exac/alleviating factors including exertion. Associated constant nonexertional SOB this entire time as well as well as feeling her fingertips are rigid. Also reports 2 25-minute episodes of blurry vision during this time. She reports having a CVA 6 yrs ago consisting of blurry vision and SOB, triggered by an argument she was having, and was admitted to the hospital and told she had a stroke and HTN. States she thinks the past 2 days have been triggered by seeing something at work that she has been thinking about a lot. +FHx of ACS at a younger age.      assessment   ---cp, blurred vision, sob, r/o acs, r/o arythmia, r/o cns patho, r/o bronchospasm, h/o HTN, ?CVA,    plan  --  adm to tele, aspirin, statin, lopressor, cont preadmit home meds, gi and dvt profilaxis  cbc, bmp, mg, phos, lipid, tsh, ce q8 x3    echo  carotid duplex    cardio cons  neuro cons.  pulm cons

## 2020-11-05 DIAGNOSIS — Z98.82 BREAST IMPLANT STATUS: Chronic | ICD-10-CM

## 2020-11-05 DIAGNOSIS — M79.89 OTHER SPECIFIED SOFT TISSUE DISORDERS: ICD-10-CM

## 2020-11-05 DIAGNOSIS — I10 ESSENTIAL (PRIMARY) HYPERTENSION: ICD-10-CM

## 2020-11-05 DIAGNOSIS — R07.9 CHEST PAIN, UNSPECIFIED: ICD-10-CM

## 2020-11-05 DIAGNOSIS — Z98.891 HISTORY OF UTERINE SCAR FROM PREVIOUS SURGERY: Chronic | ICD-10-CM

## 2020-11-05 DIAGNOSIS — R06.02 SHORTNESS OF BREATH: ICD-10-CM

## 2020-11-05 DIAGNOSIS — I63.9 CEREBRAL INFARCTION, UNSPECIFIED: ICD-10-CM

## 2020-11-05 DIAGNOSIS — H53.8 OTHER VISUAL DISTURBANCES: ICD-10-CM

## 2020-11-05 DIAGNOSIS — N39.0 URINARY TRACT INFECTION, SITE NOT SPECIFIED: ICD-10-CM

## 2020-11-05 DIAGNOSIS — Z29.9 ENCOUNTER FOR PROPHYLACTIC MEASURES, UNSPECIFIED: ICD-10-CM

## 2020-11-05 DIAGNOSIS — R74.01 ELEVATION OF LEVELS OF LIVER TRANSAMINASE LEVELS: ICD-10-CM

## 2020-11-05 LAB
24R-OH-CALCIDIOL SERPL-MCNC: 20.9 NG/ML — LOW (ref 30–80)
A1C WITH ESTIMATED AVERAGE GLUCOSE RESULT: 6.1 % — HIGH (ref 4–5.6)
ALBUMIN SERPL ELPH-MCNC: 3.1 G/DL — LOW (ref 3.5–5)
ALP SERPL-CCNC: 156 U/L — HIGH (ref 40–120)
ALT FLD-CCNC: 96 U/L DA — HIGH (ref 10–60)
ANION GAP SERPL CALC-SCNC: 6 MMOL/L — SIGNIFICANT CHANGE UP (ref 5–17)
AST SERPL-CCNC: 39 U/L — SIGNIFICANT CHANGE UP (ref 10–40)
BILIRUB SERPL-MCNC: 0.4 MG/DL — SIGNIFICANT CHANGE UP (ref 0.2–1.2)
BUN SERPL-MCNC: 23 MG/DL — HIGH (ref 7–18)
CALCIUM SERPL-MCNC: 8.6 MG/DL — SIGNIFICANT CHANGE UP (ref 8.4–10.5)
CHLORIDE SERPL-SCNC: 104 MMOL/L — SIGNIFICANT CHANGE UP (ref 96–108)
CHOLEST SERPL-MCNC: 175 MG/DL — SIGNIFICANT CHANGE UP
CO2 SERPL-SCNC: 27 MMOL/L — SIGNIFICANT CHANGE UP (ref 22–31)
CREAT SERPL-MCNC: 0.72 MG/DL — SIGNIFICANT CHANGE UP (ref 0.5–1.3)
ESTIMATED AVERAGE GLUCOSE: 128 MG/DL — HIGH (ref 68–114)
FOLATE SERPL-MCNC: 8.5 NG/ML — SIGNIFICANT CHANGE UP
GLUCOSE SERPL-MCNC: 109 MG/DL — HIGH (ref 70–99)
HCT VFR BLD CALC: 36.9 % — SIGNIFICANT CHANGE UP (ref 34.5–45)
HDLC SERPL-MCNC: 47 MG/DL — LOW
HGB BLD-MCNC: 11.8 G/DL — SIGNIFICANT CHANGE UP (ref 11.5–15.5)
LACTATE SERPL-SCNC: 1.1 MMOL/L — SIGNIFICANT CHANGE UP (ref 0.7–2)
LIPID PNL WITH DIRECT LDL SERPL: 91 MG/DL — SIGNIFICANT CHANGE UP
MAGNESIUM SERPL-MCNC: 2.5 MG/DL — SIGNIFICANT CHANGE UP (ref 1.6–2.6)
MCHC RBC-ENTMCNC: 27.6 PG — SIGNIFICANT CHANGE UP (ref 27–34)
MCHC RBC-ENTMCNC: 32 GM/DL — SIGNIFICANT CHANGE UP (ref 32–36)
MCV RBC AUTO: 86.2 FL — SIGNIFICANT CHANGE UP (ref 80–100)
NON HDL CHOLESTEROL: 128 MG/DL — SIGNIFICANT CHANGE UP
NRBC # BLD: 0 /100 WBCS — SIGNIFICANT CHANGE UP (ref 0–0)
NT-PROBNP SERPL-SCNC: 12 PG/ML — SIGNIFICANT CHANGE UP (ref 0–125)
PHOSPHATE SERPL-MCNC: 4.2 MG/DL — SIGNIFICANT CHANGE UP (ref 2.5–4.5)
PLATELET # BLD AUTO: 271 K/UL — SIGNIFICANT CHANGE UP (ref 150–400)
POTASSIUM SERPL-MCNC: 3.8 MMOL/L — SIGNIFICANT CHANGE UP (ref 3.5–5.3)
POTASSIUM SERPL-SCNC: 3.8 MMOL/L — SIGNIFICANT CHANGE UP (ref 3.5–5.3)
PROT SERPL-MCNC: 7 G/DL — SIGNIFICANT CHANGE UP (ref 6–8.3)
RBC # BLD: 4.28 M/UL — SIGNIFICANT CHANGE UP (ref 3.8–5.2)
RBC # FLD: 13.5 % — SIGNIFICANT CHANGE UP (ref 10.3–14.5)
SODIUM SERPL-SCNC: 137 MMOL/L — SIGNIFICANT CHANGE UP (ref 135–145)
TRIGL SERPL-MCNC: 187 MG/DL — HIGH
TROPONIN I SERPL-MCNC: <0.015 NG/ML — SIGNIFICANT CHANGE UP (ref 0–0.04)
TSH SERPL-MCNC: 4 UU/ML — SIGNIFICANT CHANGE UP (ref 0.34–4.82)
VIT B12 SERPL-MCNC: 499 PG/ML — SIGNIFICANT CHANGE UP (ref 232–1245)
WBC # BLD: 7.31 K/UL — SIGNIFICANT CHANGE UP (ref 3.8–10.5)
WBC # FLD AUTO: 7.31 K/UL — SIGNIFICANT CHANGE UP (ref 3.8–10.5)

## 2020-11-05 PROCEDURE — 93880 EXTRACRANIAL BILAT STUDY: CPT | Mod: 26

## 2020-11-05 PROCEDURE — 93306 TTE W/DOPPLER COMPLETE: CPT | Mod: 26

## 2020-11-05 PROCEDURE — 99223 1ST HOSP IP/OBS HIGH 75: CPT

## 2020-11-05 RX ORDER — ENOXAPARIN SODIUM 100 MG/ML
40 INJECTION SUBCUTANEOUS DAILY
Refills: 0 | Status: DISCONTINUED | OUTPATIENT
Start: 2020-11-05 | End: 2020-11-06

## 2020-11-05 RX ORDER — CEFTRIAXONE 500 MG/1
1000 INJECTION, POWDER, FOR SOLUTION INTRAMUSCULAR; INTRAVENOUS EVERY 24 HOURS
Refills: 0 | Status: DISCONTINUED | OUTPATIENT
Start: 2020-11-06 | End: 2020-11-06

## 2020-11-05 RX ORDER — HYDROCHLOROTHIAZIDE 25 MG
12.5 TABLET ORAL DAILY
Refills: 0 | Status: DISCONTINUED | OUTPATIENT
Start: 2020-11-05 | End: 2020-11-05

## 2020-11-05 RX ORDER — LORATADINE 10 MG/1
10 TABLET ORAL DAILY
Refills: 0 | Status: DISCONTINUED | OUTPATIENT
Start: 2020-11-05 | End: 2020-11-06

## 2020-11-05 RX ORDER — ONDANSETRON 8 MG/1
4 TABLET, FILM COATED ORAL ONCE
Refills: 0 | Status: DISCONTINUED | OUTPATIENT
Start: 2020-11-05 | End: 2020-11-06

## 2020-11-05 RX ORDER — ALBUTEROL 90 UG/1
2 AEROSOL, METERED ORAL EVERY 6 HOURS
Refills: 0 | Status: DISCONTINUED | OUTPATIENT
Start: 2020-11-05 | End: 2020-11-06

## 2020-11-05 RX ORDER — CEFTRIAXONE 500 MG/1
1000 INJECTION, POWDER, FOR SOLUTION INTRAMUSCULAR; INTRAVENOUS ONCE
Refills: 0 | Status: COMPLETED | OUTPATIENT
Start: 2020-11-05 | End: 2020-11-05

## 2020-11-05 RX ORDER — CEFTRIAXONE 500 MG/1
INJECTION, POWDER, FOR SOLUTION INTRAMUSCULAR; INTRAVENOUS
Refills: 0 | Status: DISCONTINUED | OUTPATIENT
Start: 2020-11-05 | End: 2020-11-06

## 2020-11-05 RX ORDER — LOSARTAN POTASSIUM 100 MG/1
100 TABLET, FILM COATED ORAL DAILY
Refills: 0 | Status: DISCONTINUED | OUTPATIENT
Start: 2020-11-05 | End: 2020-11-05

## 2020-11-05 RX ADMIN — Medication 650 MILLIGRAM(S): at 22:59

## 2020-11-05 RX ADMIN — ENOXAPARIN SODIUM 40 MILLIGRAM(S): 100 INJECTION SUBCUTANEOUS at 12:34

## 2020-11-05 RX ADMIN — Medication 650 MILLIGRAM(S): at 21:13

## 2020-11-05 RX ADMIN — Medication 650 MILLIGRAM(S): at 05:33

## 2020-11-05 RX ADMIN — Medication 1 TABLET(S): at 17:53

## 2020-11-05 RX ADMIN — Medication 12.5 MILLIGRAM(S): at 05:28

## 2020-11-05 RX ADMIN — ATORVASTATIN CALCIUM 80 MILLIGRAM(S): 80 TABLET, FILM COATED ORAL at 21:15

## 2020-11-05 RX ADMIN — Medication 12.5 MILLIGRAM(S): at 17:54

## 2020-11-05 RX ADMIN — Medication 650 MILLIGRAM(S): at 00:11

## 2020-11-05 RX ADMIN — CEFTRIAXONE 100 MILLIGRAM(S): 500 INJECTION, POWDER, FOR SOLUTION INTRAMUSCULAR; INTRAVENOUS at 09:15

## 2020-11-05 RX ADMIN — Medication 81 MILLIGRAM(S): at 12:34

## 2020-11-05 RX ADMIN — Medication 650 MILLIGRAM(S): at 13:05

## 2020-11-05 RX ADMIN — Medication 650 MILLIGRAM(S): at 12:35

## 2020-11-05 NOTE — CONSULT NOTE ADULT - SUBJECTIVE AND OBJECTIVE BOX
++++++++++++++++++++NOTE NOT COMPLETED++++++++++++++++++++++++++++++++++++++ +++++++++++++++++++++++NOTE NOT COMPLETED++++++++++++++++++++++++++++++++++++++++  Patient is a 55y old  Female who presents with a chief complaint of chest pain (2020 11:27)      HPI:  Patient, a 55 year old female from home, with PMH of HTN, stroke 6 years ago and CHF? presents to the ED with complaint of chest pain, shortness of breath and blurry vision. Patient complains of left sided chest pain that started yesterday, throbbing, initially 10 now improved to 7 in intensity after managed by ED, radiating to left shoulder. She took no medicines for the pain. She also complaints of SOB since yesterday that is present at rest and exertion. She can walk 3 blocks without getting short of breath and it is more when using stairs. Endorses orthopnea that improves with pillows. Denies fever, chills, cough, palpitations. Pt has also been having blurry vision which she describes as 'seeing crystals'. She had one episode yesterday lasting 30min and then today for 20-25min. This is associated with headache and dizziness but no blacking out or LOC.  Denies diarrhea, constipation, urinary issues, recent travel history.  She also reports swelling of feet and nausea. Pt states her fingers feel numb and weak.     In ED, pt tachycardiac to 104 on admission. EKG showed NSR. She received 325mg aspirin and 1L NS bolus.  Vital Signs Last 24 Hrs  T(C): 37 (2020 23:38), Max: 37 (2020 23:38)  T(F): 98.6 (2020 23:38), Max: 98.6 (2020 23:38)  HR: 78 (2020 23:38) (78 - 104)  BP: 101/60 (2020 23:38) (101/60 - 126/88)  RR: 18 (2020 23:38) (17 - 18)  SpO2: 98% (2020 23:38) (98% - 99%) (2020 22:04)    Pt reports having CP x 2 days.  Reports intermittent blurry vision most recently as yesterday however, denies blurry vision today.       Neurological Review of Systems:  No difficulty with language.  No vision loss or double vision.  No dizziness, vertigo or new hearing loss.  No difficulty with speech or swallowing.  No focal weakness.  No focal sensory changes.  No numbness or tingling in the bilateral lower extremities.  No difficulty with balance.  No difficulty with ambulation.        MEDICATIONS  (STANDING):  aspirin enteric coated 81 milliGRAM(s) Oral daily  atorvastatin 80 milliGRAM(s) Oral at bedtime  cefTRIAXone   IVPB      enoxaparin Injectable 40 milliGRAM(s) SubCutaneous daily  metoprolol tartrate 12.5 milliGRAM(s) Oral two times a day    MEDICATIONS  (PRN):  acetaminophen   Tablet .. 650 milliGRAM(s) Oral every 6 hours PRN Moderate Pain (4 - 6)    Allergies    No Known Allergies    Intolerances      PAST MEDICAL & SURGICAL HISTORY:  Swelling of lower extremity    Cerebrovascular accident (CVA)    HTN (hypertension)    H/O breast implant    H/O  section      FAMILY HISTORY:  No pertinent family history in first degree relatives      SOCIAL HISTORY: non smoker    Review of Systems:  Constitutional: No generalized weakness. No fevers or chills            Eyes, Ears, Mouth, Throat: No vision loss   Respiratory: No shortness of breath or cough                              Cardiovascular: No chest pain or palpitations  Gastrointestinal: No nausea or vomiting                                        Genitourinary: No urinary incontinence or burning on urination  Musculoskeletal: No joint pain                                                          Dermatologic: No rash  Neurological: as per HPI                                                                      Psychiatric: No behavioral problems  Endocrine: No known hypoglycemia               Hematologic/Lymphatic: No easy bleeding    O:  Vital Signs Last 24 Hrs  T(C): 36.7 (2020 11:11), Max: 37 (2020 23:38)  T(F): 98 (2020 11:11), Max: 98.6 (2020 23:38)  HR: 71 (2020 08:00) (70 - 104)  BP: 106/67 (2020 08:00) (101/60 - 126/88)  RR: 18 (2020 11:11) (16 - 18)  SpO2: 98% (2020 11:11) (96% - 99%)    General Exam:   General appearance: No acute distress                 Cardiovascular: Pedal dorsalis pulses intact bilaterally    Mental Status: Oriented to self, date and place.  Attention intact.  No dysarthria, aphasia or neglect.  Knowledge intact.  Registration intact.  Short and long term memory grossly intact.      Cranial Nerves: CN I - not tested.  PERRL, EOMI, VFF, no nystagmus or diplopia.  No APD.  Fundi not visualized.  CN V1-3 intact to light touch.  No facial asymmetry.  Hearing intact to finger rub bilaterally.  Tongue, uvula and palate midline.  Sternocleidomastoid and Trapezius intact bilaterally.    Motor:   Tone: Normal                  Strength intact throughout  No pronator drift bilaterally                      No dysmetria on finger-nose-finger or heel-shin-heel  No truncal ataxia.  No resting, postural or action tremor.  No myoclonus.    Sensation: intact to light touch    Deep Tendon Reflexes: 1+ bilateral biceps, triceps, brachioradialis, knee and ankle  Toes flexor bilaterally    Gait: Normal and stable.  (-) Romberg    Other:   NIHSS=0  MRS=0    LABS:                        11.8   7.31  )-----------( 271      ( 2020 07:08 )             36.9     11-05    137  |  104  |  23<H>  ----------------------------<  109<H>  3.8   |  27  |  0.72    Ca    8.6      2020 07:08  Phos  4.2     11-05  Mg     2.5     11-05    TPro  7.0  /  Alb  3.1<L>  /  TBili  0.4  /  DBili  x   /  AST  39  /  ALT  96<H>  /  AlkPhos  156<H>  11-05    PT/INR - ( 2020 16:34 )   PT: 12.9 sec;   INR: 1.09 ratio         PTT - ( 2020 16:34 )  PTT:30.9 sec  Urinalysis Basic - ( 2020 19:17 )    Color: Yellow / Appearance: Clear / S.015 / pH: x  Gluc: x / Ketone: Negative  / Bili: Negative / Urobili: Negative   Blood: x / Protein: Negative / Nitrite: Negative   Leuk Esterase: Trace / RBC: 2-5 /HPF / WBC 6-10 /HPF   Sq Epi: x / Non Sq Epi: Moderate /HPF / Bacteria: Few /HPF          RADIOLOGY & ADDITIONAL STUDIES:  t< from: CT Head No Cont (20 @ 17:15) >  EXAM:  CT BRAIN                            PROCEDURE DATE:  2020          INTERPRETATION:  Noncontrast CT of the brain.    CLINICAL INDICATION:  Blurry vision    TECHNIQUE : Axial CT scanning of the brain was obtained from the skull base to the vertex without the administration of intravenous contrast.    COMPARISON: None available    FINDINGS:  No acute hemorrhage, hydrocephalus, midline shift or extra-axial collections are identified. Dense bilateral globus pallidus calcifications are present.    The orbits are not remarkable in appearance.    The visualized paranasal sinuses and tympanomastoid cavities are free of acute disease.    Impression:    No acute hemorrhage, mass effect or extra-axial collections.  Dense bilateral globus pallidus calcification may be incidental. Metabolic/toxic etiologies are not excluded.                    BEBETO INTERIANO MD; Attending Radiologist  This document has been electronically signed. 2020  5:25PM    < end of copied text >   +++++++++++++++++++++++NOTE NOT COMPLETED++++++++++++++++++++++++++++++++++++++++  Patient is a 55y old  Female who presents with a chief complaint of chest pain (2020 11:27)      HPI:  Patient, a 55 year old female from home, with PMH of HTN, stroke 6 years ago and CHF? presents to the ED with complaint of chest pain, shortness of breath and blurry vision. Patient complains of left sided chest pain that started yesterday, throbbing, initially 10 now improved to 7 in intensity after managed by ED, radiating to left shoulder. She took no medicines for the pain. She also complaints of SOB since yesterday that is present at rest and exertion. She can walk 3 blocks without getting short of breath and it is more when using stairs. Endorses orthopnea that improves with pillows. Denies fever, chills, cough, palpitations. Pt has also been having blurry vision which she describes as 'seeing crystals'. She had one episode yesterday lasting 30min and then today for 20-25min. This is associated with headache and dizziness but no blacking out or LOC.  Denies diarrhea, constipation, urinary issues, recent travel history.  She also reports swelling of feet and nausea. Pt states her fingers feel numb and weak.     In ED, pt tachycardiac to 104 on admission. EKG showed NSR. She received 325mg aspirin and 1L NS bolus.  Vital Signs Last 24 Hrs  T(C): 37 (2020 23:38), Max: 37 (2020 23:38)  T(F): 98.6 (2020 23:38), Max: 98.6 (2020 23:38)  HR: 78 (2020 23:38) (78 - 104)  BP: 101/60 (2020 23:38) (101/60 - 126/88)  RR: 18 (2020 23:38) (17 - 18)  SpO2: 98% (2020 23:38) (98% - 99%) (2020 22:04)    Pt reports having CP x 2 days.  Reports intermittent blurry vision most recently as yesterday however, denies blurry vision today.       Neurological Review of Systems:  No difficulty with language.  No vision loss or double vision.  No dizziness, vertigo or new hearing loss.  No difficulty with speech or swallowing.  No focal weakness.  No focal sensory changes.  No numbness or tingling in the bilateral lower extremities.  No difficulty with balance.  No difficulty with ambulation.        MEDICATIONS  (STANDING):  aspirin enteric coated 81 milliGRAM(s) Oral daily  atorvastatin 80 milliGRAM(s) Oral at bedtime  cefTRIAXone   IVPB      enoxaparin Injectable 40 milliGRAM(s) SubCutaneous daily  metoprolol tartrate 12.5 milliGRAM(s) Oral two times a day    MEDICATIONS  (PRN):  acetaminophen   Tablet .. 650 milliGRAM(s) Oral every 6 hours PRN Moderate Pain (4 - 6)    Allergies    No Known Allergies    Intolerances      PAST MEDICAL & SURGICAL HISTORY:  Swelling of lower extremity    Cerebrovascular accident (CVA)    HTN (hypertension)    H/O breast implant    H/O  section      FAMILY HISTORY:  No pertinent family history in first degree relatives      SOCIAL HISTORY: non smoker    Review of Systems:  Constitutional: No generalized weakness. No fevers or chills            Eyes, Ears, Mouth, Throat: No vision loss   Respiratory: No shortness of breath or cough                              Cardiovascular: No chest pain or palpitations  Gastrointestinal: No nausea or vomiting                                        Genitourinary: No urinary incontinence or burning on urination  Musculoskeletal: No joint pain                                                          Dermatologic: No rash  Neurological: as per HPI                                                                      Psychiatric: No behavioral problems  Endocrine: No known hypoglycemia               Hematologic/Lymphatic: No easy bleeding    O:  Vital Signs Last 24 Hrs  T(C): 36.7 (2020 11:11), Max: 37 (2020 23:38)  T(F): 98 (2020 11:11), Max: 98.6 (2020 23:38)  HR: 71 (2020 08:00) (70 - 104)  BP: 106/67 (2020 08:00) (101/60 - 126/88)  RR: 18 (2020 11:11) (16 - 18)  SpO2: 98% (2020 11:11) (96% - 99%)    General Exam:   General appearance: No acute distress                 Cardiovascular: Pedal dorsalis pulses intact bilaterally    Mental Status: Oriented to self, date and place.  Attention intact.  No dysarthria, aphasia or neglect.  Knowledge intact.  Registration intact.  Short and long term memory grossly intact.      Cranial Nerves: CN I - not tested.  PERRL, EOMI, VFF, no nystagmus or diplopia.  No APD.  Fundi not visualized.  CN V1-3 intact to light touch.  No facial asymmetry.  Hearing intact to finger rub bilaterally.  Tongue, uvula and palate midline.  Sternocleidomastoid and Trapezius intact bilaterally.    Motor:   Tone: Normal                  Strength intact throughout  No pronator drift bilaterally                      No dysmetria on finger-nose-finger or heel-shin-heel  No truncal ataxia.  No resting, postural or action tremor.  No myoclonus.    Sensation: intact to light touch    Deep Tendon Reflexes: 1+ bilateral biceps, triceps, brachioradialis, knee and ankle  Toes flexor bilaterally    Gait: Normal and stable.  (-) Romberg    Other:   NIHSS=0  MRS=0    LABS:                        11.8   7.31  )-----------( 271      ( 2020 07:08 )             36.9     11-05    137  |  104  |  23<H>  ----------------------------<  109<H>  3.8   |  27  |  0.72    Ca    8.6      2020 07:08  Phos  4.2     11-05  Mg     2.5     11-05    TPro  7.0  /  Alb  3.1<L>  /  TBili  0.4  /  DBili  x   /  AST  39  /  ALT  96<H>  /  AlkPhos  156<H>  11-05    PT/INR - ( 2020 16:34 )   PT: 12.9 sec;   INR: 1.09 ratio         PTT - ( 2020 16:34 )  PTT:30.9 sec  Urinalysis Basic - ( 2020 19:17 )    Color: Yellow / Appearance: Clear / S.015 / pH: x  Gluc: x / Ketone: Negative  / Bili: Negative / Urobili: Negative   Blood: x / Protein: Negative / Nitrite: Negative   Leuk Esterase: Trace / RBC: 2-5 /HPF / WBC 6-10 /HPF   Sq Epi: x / Non Sq Epi: Moderate /HPF / Bacteria: Few /HPF          RADIOLOGY & ADDITIONAL STUDIES:  t< from: CT Head No Cont (11.04.20 @ 17:15) >  EXAM:  CT BRAIN                            PROCEDURE DATE:  2020          INTERPRETATION:  Noncontrast CT of the brain.    CLINICAL INDICATION:  Blurry vision    TECHNIQUE : Axial CT scanning of the brain was obtained from the skull base to the vertex without the administration of intravenous contrast.    COMPARISON: None available    FINDINGS:  No acute hemorrhage, hydrocephalus, midline shift or extra-axial collections are identified. Dense bilateral globus pallidus calcifications are present.    The orbits are not remarkable in appearance.    The visualized paranasal sinuses and tympanomastoid cavities are free of acute disease.    Impression:    No acute hemorrhage, mass effect or extra-axial collections.  Dense bilateral globus pallidus calcification may be incidental. Metabolic/toxic etiologies are not excluded.                    BEBETO INTERIANO MD; Attending Radiologist  This document has been electronically signed. 2020  5:25PM    < end of copied text >    < from: US Duplex Carotid Arteries Complete, Bilateral (20 @ 11:46) >    EXAM:  US DPLX CAROTIDS COMPL BI                            PROCEDURE DATE:  2020          INTERPRETATION:  CLINICAL INFORMATION: Dizziness    COMPARISON: None available.    TECHNIQUE: Grayscale, color and spectral Doppler examination of bothcarotid arteries was performed.    FINDINGS:    No elevated velocities or abnormal waveforms are encountered.    Blood flow velocities are as follows:    RIGHT:  PROX CCA = 76 ;  DIST CCA = 68 ;  PROX ICA = 79 ;  DIST ICA = 79 ;  ECA = 101    LEFT:  PROX CCA = 93 ;  DIST CCA = 71 ;  PROX ICA = 47 ;  DIST ICA = 98 ;  ECA = 42    Antegrade flow is noted within both vertebral arteries.    IMPRESSION: No significant hemodynamic stenosis of either carotid artery.    Measurement of carotid stenosis is based on velocity parameters that correlate the residual internal carotid diameter with that of the more distal vessel in accordance with a method such as the [employer] Symptomatic Carotid Endarterectomy Trial (NASCET).                DAVID WILLIAM MD; Attending Radiologist  This document has been electronically signed. 2020 11:49AM    < end of copied text >     Patient is a 55y old  Female who presents with a chief complaint of chest pain (2020 11:27)      HPI:  Patient, a 55 year old female from home, with PMH of HTN, stroke 6 years ago and CHF? presents to the ED with complaint of chest pain, shortness of breath and blurry vision. Patient complains of left sided chest pain that started yesterday, throbbing, initially 10 now improved to 7 in intensity after managed by ED, radiating to left shoulder. She took no medicines for the pain. She also complaints of SOB since yesterday that is present at rest and exertion. She can walk 3 blocks without getting short of breath and it is more when using stairs. Endorses orthopnea that improves with pillows. Denies fever, chills, cough, palpitations. Pt has also been having blurry vision which she describes as 'seeing crystals'. She had one episode yesterday lasting 30min and then today for 20-25min. This is associated with headache and dizziness but no blacking out or LOC.  Denies diarrhea, constipation, urinary issues, recent travel history.  She also reports swelling of feet and nausea. Pt states her fingers feel numb and weak.     In ED, pt tachycardiac to 104 on admission. EKG showed NSR. She received 325mg aspirin and 1L NS bolus.  Vital Signs Last 24 Hrs  T(C): 37 (2020 23:38), Max: 37 (2020 23:38)  T(F): 98.6 (2020 23:38), Max: 98.6 (2020 23:38)  HR: 78 (2020 23:38) (78 - 104)  BP: 101/60 (2020 23:38) (101/60 - 126/88)  RR: 18 (2020 23:38) (17 - 18)  SpO2: 98% (2020 23:38) (98% - 99%) (2020 22:04)    Pt reports having CP x 2 days.  Reports intermittent blurry vision most recently as yesterday however, denies blurry vision today.       Neurological Review of Systems:  No difficulty with language.  No vision loss or double vision.  No dizziness, vertigo or new hearing loss.  No difficulty with speech or swallowing.  No focal weakness.  No focal sensory changes.  No numbness or tingling in the bilateral lower extremities.  No difficulty with balance.  No difficulty with ambulation.        MEDICATIONS  (STANDING):  aspirin enteric coated 81 milliGRAM(s) Oral daily  atorvastatin 80 milliGRAM(s) Oral at bedtime  cefTRIAXone   IVPB      enoxaparin Injectable 40 milliGRAM(s) SubCutaneous daily  metoprolol tartrate 12.5 milliGRAM(s) Oral two times a day    MEDICATIONS  (PRN):  acetaminophen   Tablet .. 650 milliGRAM(s) Oral every 6 hours PRN Moderate Pain (4 - 6)    Allergies    No Known Allergies    Intolerances      PAST MEDICAL & SURGICAL HISTORY:  Swelling of lower extremity    Cerebrovascular accident (CVA)    HTN (hypertension)    H/O breast implant    H/O  section      FAMILY HISTORY:  No pertinent family history in first degree relatives      SOCIAL HISTORY: non smoker    Review of Systems:  Constitutional: No generalized weakness. No fevers or chills            Eyes, Ears, Mouth, Throat: No vision loss   Respiratory: No shortness of breath or cough                              Cardiovascular: No chest pain or palpitations  Gastrointestinal: No nausea or vomiting                                        Genitourinary: No urinary incontinence or burning on urination  Musculoskeletal: No joint pain                                                          Dermatologic: No rash  Neurological: as per HPI                                                                      Psychiatric: No behavioral problems  Endocrine: No known hypoglycemia               Hematologic/Lymphatic: No easy bleeding    O:  Vital Signs Last 24 Hrs  T(C): 36.7 (2020 11:11), Max: 37 (2020 23:38)  T(F): 98 (2020 11:11), Max: 98.6 (2020 23:38)  HR: 71 (2020 08:00) (70 - 104)  BP: 106/67 (2020 08:00) (101/60 - 126/88)  RR: 18 (2020 11:11) (16 - 18)  SpO2: 98% (2020 11:11) (96% - 99%)    General Exam:   General appearance: No acute distress                 Cardiovascular: Pedal dorsalis pulses intact bilaterally    Mental Status: Oriented to self, date and place.  Attention intact.  No dysarthria, aphasia or neglect.  Knowledge intact.  Registration intact.  Short and long term memory grossly intact.      Cranial Nerves: CN I - not tested.  PERRL, EOMI, VFF, no nystagmus or diplopia.  No APD.  Fundi not visualized.  CN V1-3 intact to light touch.  No facial asymmetry.  Hearing intact to finger rub bilaterally.  Tongue, uvula and palate midline.  Sternocleidomastoid and Trapezius intact bilaterally.    Motor:   Tone: Normal                  Strength intact throughout  No pronator drift bilaterally                      No dysmetria on finger-nose-finger or heel-shin-heel  No truncal ataxia.  No resting, postural or action tremor.  No myoclonus.    Sensation: intact to light touch    Deep Tendon Reflexes: 1+ bilateral biceps, triceps, brachioradialis, knee and ankle  Toes flexor bilaterally    Gait: Normal and stable.  (-) Romberg    Other:   NIHSS=0  MRS=0    LABS:                        11.8   7.31  )-----------( 271      ( 2020 07:08 )             36.9     11-05    137  |  104  |  23<H>  ----------------------------<  109<H>  3.8   |  27  |  0.72    Ca    8.6      2020 07:08  Phos  4.2     11-05  Mg     2.5     11-05    TPro  7.0  /  Alb  3.1<L>  /  TBili  0.4  /  DBili  x   /  AST  39  /  ALT  96<H>  /  AlkPhos  156<H>  11-05    PT/INR - ( 2020 16:34 )   PT: 12.9 sec;   INR: 1.09 ratio         PTT - ( 2020 16:34 )  PTT:30.9 sec  Urinalysis Basic - ( 2020 19:17 )    Color: Yellow / Appearance: Clear / S.015 / pH: x  Gluc: x / Ketone: Negative  / Bili: Negative / Urobili: Negative   Blood: x / Protein: Negative / Nitrite: Negative   Leuk Esterase: Trace / RBC: 2-5 /HPF / WBC 6-10 /HPF   Sq Epi: x / Non Sq Epi: Moderate /HPF / Bacteria: Few /HPF          RADIOLOGY & ADDITIONAL STUDIES:  t< from: CT Head No Cont (20 @ 17:15) >  EXAM:  CT BRAIN                            PROCEDURE DATE:  2020          INTERPRETATION:  Noncontrast CT of the brain.    CLINICAL INDICATION:  Blurry vision    TECHNIQUE : Axial CT scanning of the brain was obtained from the skull base to the vertex without the administration of intravenous contrast.    COMPARISON: None available    FINDINGS:  No acute hemorrhage, hydrocephalus, midline shift or extra-axial collections are identified. Dense bilateral globus pallidus calcifications are present.    The orbits are not remarkable in appearance.    The visualized paranasal sinuses and tympanomastoid cavities are free of acute disease.    Impression:    No acute hemorrhage, mass effect or extra-axial collections.  Dense bilateral globus pallidus calcification may be incidental. Metabolic/toxic etiologies are not excluded.                    BEBETO INTERIANO MD; Attending Radiologist  This document has been electronically signed. 2020  5:25PM    < end of copied text >    < from: US Duplex Carotid Arteries Complete, Bilateral (20 @ 11:46) >    EXAM:  US DPLX CAROTIDS COMPL BI                            PROCEDURE DATE:  2020          INTERPRETATION:  CLINICAL INFORMATION: Dizziness    COMPARISON: None available.    TECHNIQUE: Grayscale, color and spectral Doppler examination of bothcarotid arteries was performed.    FINDINGS:    No elevated velocities or abnormal waveforms are encountered.    Blood flow velocities are as follows:    RIGHT:  PROX CCA = 76 ;  DIST CCA = 68 ;  PROX ICA = 79 ;  DIST ICA = 79 ;  ECA = 101    LEFT:  PROX CCA = 93 ;  DIST CCA = 71 ;  PROX ICA = 47 ;  DIST ICA = 98 ;  ECA = 42    Antegrade flow is noted within both vertebral arteries.    IMPRESSION: No significant hemodynamic stenosis of either carotid artery.    Measurement of carotid stenosis is based on velocity parameters that correlate the residual internal carotid diameter with that of the more distal vessel in accordance with a method such as the [employer] Symptomatic Carotid Endarterectomy Trial (NASCET).                DAVID WILLIAM MD; Attending Radiologist  This document has been electronically signed. 2020 11:49AM    < end of copied text >     Patient is a 55y old  Female who presents with a chief complaint of chest pain (2020 11:27)      HPI:  Patient, a 55 year old female from home, with PMH of HTN, stroke 6 years ago [SEE ATTENDING ATTESTATION] and CHF? presents to the ED with complaint of chest pain, shortness of breath and blurry vision. Patient complains of left sided chest pain that started yesterday, throbbing, initially 10 now improved to 7 in intensity after managed by ED, radiating to left shoulder. She took no medicines for the pain. She also complaints of SOB since yesterday that is present at rest and exertion. She can walk 3 blocks without getting short of breath and it is more when using stairs. Endorses orthopnea that improves with pillows. Denies fever, chills, cough, palpitations. Pt has also been having blurry vision which she describes as 'seeing crystals'. She had one episode yesterday lasting 30min and then today for 20-25min. This is associated with headache and dizziness but no blacking out or LOC.  Denies diarrhea, constipation, urinary issues, recent travel history.  She also reports swelling of feet and nausea. Pt states her fingers feel numb and weak.     In ED, pt tachycardiac to 104 on admission. EKG showed NSR. She received 325mg aspirin and 1L NS bolus.  Vital Signs Last 24 Hrs  T(C): 37 (2020 23:38), Max: 37 (2020 23:38)  T(F): 98.6 (2020 23:38), Max: 98.6 (2020 23:38)  HR: 78 (2020 23:38) (78 - 104)  BP: 101/60 (2020 23:38) (101/60 - 126/88)  RR: 18 (2020 23:38) (17 - 18)  SpO2: 98% (2020 23:38) (98% - 99%) (2020 22:04)    Pt reports having CP x 2 days.  Reports intermittent blurry vision most recently as yesterday however, denies blurry vision today.       Neurological Review of Systems:  No difficulty with language.  No vision loss or double vision.  No dizziness, vertigo or new hearing loss.  No difficulty with speech or swallowing.  No focal weakness.  No focal sensory changes.  No numbness or tingling in the bilateral lower extremities.  No difficulty with balance.  No difficulty with ambulation.        MEDICATIONS  (STANDING):  aspirin enteric coated 81 milliGRAM(s) Oral daily  atorvastatin 80 milliGRAM(s) Oral at bedtime  cefTRIAXone   IVPB      enoxaparin Injectable 40 milliGRAM(s) SubCutaneous daily  metoprolol tartrate 12.5 milliGRAM(s) Oral two times a day    MEDICATIONS  (PRN):  acetaminophen   Tablet .. 650 milliGRAM(s) Oral every 6 hours PRN Moderate Pain (4 - 6)    Allergies    No Known Allergies    Intolerances      PAST MEDICAL & SURGICAL HISTORY:  Swelling of lower extremity    Cerebrovascular accident (CVA)    HTN (hypertension)    H/O breast implant    H/O  section      FAMILY HISTORY:  No pertinent family history in first degree relatives      SOCIAL HISTORY: non smoker    Review of Systems:  Constitutional: No generalized weakness. No fevers or chills            Eyes, Ears, Mouth, Throat: No vision loss   Respiratory: No shortness of breath or cough                              Cardiovascular: No chest pain or palpitations  Gastrointestinal: No nausea or vomiting                                        Genitourinary: No urinary incontinence or burning on urination  Musculoskeletal: No joint pain                                                          Dermatologic: No rash  Neurological: as per HPI                                                                      Psychiatric: No behavioral problems  Endocrine: No known hypoglycemia               Hematologic/Lymphatic: No easy bleeding    O:  Vital Signs Last 24 Hrs  T(C): 36.7 (2020 11:11), Max: 37 (2020 23:38)  T(F): 98 (2020 11:11), Max: 98.6 (2020 23:38)  HR: 71 (2020 08:00) (70 - 104)  BP: 106/67 (2020 08:00) (101/60 - 126/88)  RR: 18 (2020 11:11) (16 - 18)  SpO2: 98% (2020 11:11) (96% - 99%)    General Exam:   General appearance: No acute distress                 Cardiovascular: Pedal dorsalis pulses intact bilaterally    Mental Status: Oriented to self, date and place.  Attention intact.  No dysarthria, aphasia or neglect.  Knowledge intact.  Registration intact.  Short and long term memory grossly intact.      Cranial Nerves: CN I - not tested.  PERRL, EOMI, VFF, no nystagmus or diplopia.  No APD.  Fundi not visualized.  CN V1-3 intact to light touch.  No facial asymmetry.  Hearing intact to finger rub bilaterally.  Tongue, uvula and palate midline.  Sternocleidomastoid and Trapezius intact bilaterally.    Motor:   Tone: Normal                  Strength intact throughout  No pronator drift bilaterally                      No dysmetria on finger-nose-finger or heel-shin-heel  No truncal ataxia.  No resting, postural or action tremor.  No myoclonus.    Sensation: intact to light touch    Deep Tendon Reflexes: 1+ bilateral biceps, triceps, brachioradialis, knee and ankle  Toes flexor bilaterally    Gait: Normal and stable.  (-) Romberg    Other:   NIHSS=0  MRS=0    LABS:                        11.8   7.31  )-----------( 271      ( 2020 07:08 )             36.9     11-05    137  |  104  |  23<H>  ----------------------------<  109<H>  3.8   |  27  |  0.72    Ca    8.6      2020 07:08  Phos  4.2     11-  Mg     2.5     -    TPro  7.0  /  Alb  3.1<L>  /  TBili  0.4  /  DBili  x   /  AST  39  /  ALT  96<H>  /  AlkPhos  156<H>  11-05    PT/INR - ( 2020 16:34 )   PT: 12.9 sec;   INR: 1.09 ratio         PTT - ( 2020 16:34 )  PTT:30.9 sec  Urinalysis Basic - ( 2020 19:17 )    Color: Yellow / Appearance: Clear / S.015 / pH: x  Gluc: x / Ketone: Negative  / Bili: Negative / Urobili: Negative   Blood: x / Protein: Negative / Nitrite: Negative   Leuk Esterase: Trace / RBC: 2-5 /HPF / WBC 6-10 /HPF   Sq Epi: x / Non Sq Epi: Moderate /HPF / Bacteria: Few /HPF          RADIOLOGY & ADDITIONAL STUDIES:  t< from: CT Head No Cont (20 @ 17:15) >  EXAM:  CT BRAIN                            PROCEDURE DATE:  2020          INTERPRETATION:  Noncontrast CT of the brain.    CLINICAL INDICATION:  Blurry vision    TECHNIQUE : Axial CT scanning of the brain was obtained from the skull base to the vertex without the administration of intravenous contrast.    COMPARISON: None available    FINDINGS:  No acute hemorrhage, hydrocephalus, midline shift or extra-axial collections are identified. Dense bilateral globus pallidus calcifications are present.    The orbits are not remarkable in appearance.    The visualized paranasal sinuses and tympanomastoid cavities are free of acute disease.    Impression:    No acute hemorrhage, mass effect or extra-axial collections.  Dense bilateral globus pallidus calcification may be incidental. Metabolic/toxic etiologies are not excluded.                    BEBETO INTERIANO MD; Attending Radiologist  This document has been electronically signed. 2020  5:25PM    < end of copied text >    < from: US Duplex Carotid Arteries Complete, Bilateral (20 @ 11:46) >    EXAM:  US DPLX CAROTIDS COMPL BI                            PROCEDURE DATE:  2020          INTERPRETATION:  CLINICAL INFORMATION: Dizziness    COMPARISON: None available.    TECHNIQUE: Grayscale, color and spectral Doppler examination of bothcarotid arteries was performed.    FINDINGS:    No elevated velocities or abnormal waveforms are encountered.    Blood flow velocities are as follows:    RIGHT:  PROX CCA = 76 ;  DIST CCA = 68 ;  PROX ICA = 79 ;  DIST ICA = 79 ;  ECA = 101    LEFT:  PROX CCA = 93 ;  DIST CCA = 71 ;  PROX ICA = 47 ;  DIST ICA = 98 ;  ECA = 42    Antegrade flow is noted within both vertebral arteries.    IMPRESSION: No significant hemodynamic stenosis of either carotid artery.    Measurement of carotid stenosis is based on velocity parameters that correlate the residual internal carotid diameter with that of the more distal vessel in accordance with a method such as the [employer] Symptomatic Carotid Endarterectomy Trial (NASCET).                DAVID WILLIAM MD; Attending Radiologist  This document has been electronically signed. 2020 11:49AM    < end of copied text >

## 2020-11-05 NOTE — PROGRESS NOTE ADULT - PROBLEM SELECTOR PLAN 5
pt had a stroke 6y ago, uncertain about residual effects  c/w aspirin and statin UA+, asymptomatic  IV CTX for 3 days

## 2020-11-05 NOTE — PROGRESS NOTE ADULT - SUBJECTIVE AND OBJECTIVE BOX
PGY 1 Note discussed with supervising resident and primary attending  PGY-1: Makayla Miller MD  PAGER #: [phone] TILL 5:00 PM  Please contact On Call team 5PM - 8:30PM  Please contact [employer] team 8:30PM - 7:30AM  Patient is a 55y old  Female who presents with a chief complaint of chest pain (2020 11:27)    Brief Hospital Course: Patient, a 55 year old female from home, with PMH of HTN, stroke 6 years ago and CHF? presents to the ED with complaint of chest pain, shortness of breath and blurry vision. Patient complains of left sided chest pain that started yesterday, throbbing, initially 10 now improved to 7 in intensity after managed by ED, radiating to left shoulder. She took no medicines for the pain. She also complaints of SOB since yesterday that is present at rest and exertion. She can walk 3 blocks without getting short of breath and it is more when using stairs. Endorses orthopnea that improves with pillows. Denies fever, chills, cough, palpitations. Pt has also been having blurry vision which she describes as 'seeing crystals'. She had one episode yesterday lasting 30min and then today for 20-25min. This is associated with headache and dizziness but no blacking out or LOC.  Denies diarrhea, constipation, urinary issues, recent travel history.  She also reports swelling of feet and nausea. Pt states her fingers feel numb and weak.     In ED, pt tachycardiac to 104 on admission. EKG showed NSR. She received 325mg aspirin and 1L NS bolus.    CT head showed globus pallidius calcification.     INTERVAL HPI/OVERNIGHT EVENTS: No acute events noted overnight. Patient with no new acute complaints, no chest pain currently but occasional SOB. Pending neurology evaluation, ECHO, stress test.     MEDICATIONS  (STANDING):  aspirin enteric coated 81 milliGRAM(s) Oral daily  atorvastatin 80 milliGRAM(s) Oral at bedtime  cefTRIAXone   IVPB      enoxaparin Injectable 40 milliGRAM(s) SubCutaneous daily  metoprolol tartrate 12.5 milliGRAM(s) Oral two times a day    MEDICATIONS  (PRN):  acetaminophen   Tablet .. 650 milliGRAM(s) Oral every 6 hours PRN Moderate Pain (4 - 6)      __________________________________________________  REVIEW OF SYSTEMS:  CONSTITUTIONAL: No fever, weight loss, or fatigue  RESPIRATORY: +SOB No cough, wheezing, chills or hemoptysis;  CARDIOVASCULAR: + Chest pain No palpitations, dizziness, or leg swelling  GASTROINTESTINAL: No abdominal pain. No nausea, vomiting, or hematemesis; No diarrhea or constipation. No melena or hematochezia.  GENITOURINARY: No dysuria or hematuria, no burning micturition, no polyuria, no urinary hesitancy, no nocturia, normal urinary frequency  NEUROLOGICAL: No headaches, memory loss, loss of strength, numbness, or tremors  SKIN: No itching, burning, rashes, or lesions   All other ROS negative except noted above    Vital Signs Last 24 Hrs  T(C): 36.7 (2020 11:11), Max: 37 (2020 23:38)  T(F): 98 (2020 11:11), Max: 98.6 (2020 23:38)  HR: 71 (2020 08:00) (70 - 104)  BP: 106/67 (2020 08:00) (101/60 - 126/88)  BP(mean): --  RR: 18 (2020 11:11) (16 - 18)  SpO2: 98% (2020 11:11) (96% - 99%)    ________________________________________________  PHYSICAL EXAMINATION:  GENERAL: NAD, well-developed  HEAD:  Atraumatic, Normocephalic  EYES:  conjunctiva and sclera clear  NECK: Supple, No JVD, Normal thyroid  CHEST/LUNG: Clear to auscultation bilaterally; No rales, rhonchi, wheezing, or rubs  HEART: Regular rate and rhythm; No murmurs, rubs, or gallops  ABDOMEN: Soft, Nontender, Nondistended; Bowel sounds present  NERVOUS SYSTEM:  Alert & Oriented X3,  Moving all 4 extremities  EXTREMITIES:  Peripheral pulses palpable. No clubbing, cyanosis, or edema  SKIN: Warm, Dry, no rashes or lesions    _________________________________________________  LABS:                        11.8   7.31  )-----------( 271      ( 2020 07:08 )             36.9     11-    137  |  104  |  23<H>  ----------------------------<  109<H>  3.8   |  27  |  0.72    Ca    8.6      2020 07:08  Phos  4.2       Mg     2.5         TPro  7.0  /  Alb  3.1<L>  /  TBili  0.4  /  DBili  x   /  AST  39  /  ALT  96<H>  /  AlkPhos  156<H>  1105    PT/INR - ( 2020 16:34 )   PT: 12.9 sec;   INR: 1.09 ratio         PTT - ( 2020 16:34 )  PTT:30.9 sec  Urinalysis Basic - ( 2020 19:17 )    Color: Yellow / Appearance: Clear / S.015 / pH: x  Gluc: x / Ketone: Negative  / Bili: Negative / Urobili: Negative   Blood: x / Protein: Negative / Nitrite: Negative   Leuk Esterase: Trace / RBC: 2-5 /HPF / WBC 6-10 /HPF   Sq Epi: x / Non Sq Epi: Moderate /HPF / Bacteria: Few /HPF      CAPILLARY BLOOD GLUCOSE            RADIOLOGY & ADDITIONAL TESTS:    < from: CT Head No Cont (20 @ 17:15) >  Impression:    No acute hemorrhage, mass effect or extra-axial collections.  Dense bilateral globus pallidus calcification may be incidental. Metabolic/toxic etiologies are not excluded.    < end of copied text >  Imaging Personally Reviewed:  YES    Consultant(s) Notes Reviewed:   YES    Care Discussed with Consultants : YES     Plan of care was discussed with patient and /or primary care giver; all questions and concerns were addressed and care was aligned with patient's wishes.

## 2020-11-05 NOTE — PROGRESS NOTE ADULT - PROBLEM SELECTOR PLAN 6
Downtrending  pt does not have any abdominal pain  Consider RUQ u/s if persistent elevation  Trend LFTs pt had a stroke 6y ago, uncertain about residual effects  c/w aspirin and statin

## 2020-11-05 NOTE — PROGRESS NOTE ADULT - PROBLEM SELECTOR PLAN 9
[ ] Previous VTE                                    3  [ ] Thrombophilia                                  2  [ ] Lower limb paralysis                        2  (unable to hold up >15 seconds)    [ ] Current Cancer (within 6 months)        2   [x] Immobilization > 24 hrs                    1  [ ] ICU/CCU stay > 24 hrs                      1  [ ] Age > 60                                         1    VTE score:1  start lovenox ppx  GI ppx: Not indicated  Diet: Regular  Electrolytes replaced PRN  Dispo: Home  FULL CODE

## 2020-11-05 NOTE — CONSULT NOTE ADULT - SUBJECTIVE AND OBJECTIVE BOX
PULMONARY CONSULT NOTE      KIKE BISHOP  MRN-[id number]    Patient is a 55y old  Female who presents with a chief complaint of chest pain (2020 10:36)      HISTORY OF PRESENT ILLNESS:    History of Present Illness:  Reason for Admission: chest pain  History of Present Illness:   Patient, a 55 year old female from home, with PMH of HTN, stroke 6 years ago and CHF? presents to the ED with complaint of chest pain, shortness of breath and blurry vision. Patient complains of left sided chest pain that started yesterday, throbbing, initially 10 now improved to 7 in intensity after managed by ED, radiating to left shoulder. She took no medicines for the pain. She also complaints of SOB since yesterday that is present at rest and exertion. She can walk 3 blocks without getting short of breath and it is more when using stairs. Endorses orthopnea that improves with pillows. Denies fever, chills, cough, palpitations. Pt has also been having blurry vision which she describes as 'seeing crystals'. She had one episode yesterday lasting 30min and then today for 20-25min. This is associated with headache and dizziness but no blacking out or LOC.  Denies diarrhea, constipation, urinary issues, recent travel history.  She also reports swelling of feet and nausea. Pt states her fingers feel numb and weak.     In ED, pt tachycardiac to 104 on admission. EKG showed NSR. She received 325mg aspirin and 1L NS bolus.  Vital Signs Last 24 Hrs    T(C): 37 (2020 23:38), Max: 37 (2020 23:38)  T(F): 98.6 (2020 23:38), Max: 98.6 (2020 23:38)  HR: 78 (2020 23:38) (78 - 104)  BP: 101/60 (2020 23:38) (101/60 - 126/88)  BP(mean): --  RR: 18 (2020 23:38) (17 - 18)  SpO2: 98% (2020 23:38) (98% - 99%)    Pt is awake, alert, lying in bed in NAD. Reports SOB at times. Denies CP at present. Denies hx of Asthma/COPD. Former smoker, quit 8 yr ago.     MEDICATIONS  (STANDING):  aspirin enteric coated 81 milliGRAM(s) Oral daily  atorvastatin 80 milliGRAM(s) Oral at bedtime  cefTRIAXone   IVPB      enoxaparin Injectable 40 milliGRAM(s) SubCutaneous daily  metoprolol tartrate 12.5 milliGRAM(s) Oral two times a day      MEDICATIONS  (PRN):  acetaminophen   Tablet .. 650 milliGRAM(s) Oral every 6 hours PRN Moderate Pain (4 - 6)      Allergies    No Known Allergies    Intolerances        PAST MEDICAL & SURGICAL HISTORY:  Swelling of lower extremity    Cerebrovascular accident (CVA)    HTN (hypertension)    H/O breast implant    H/O  section        FAMILY HISTORY:  No pertinent family history in first degree relatives        SOCIAL HISTORY  Smoking History:     REVIEW OF SYSTEMS:    CONSTITUTIONAL:  No fevers, chills, sweats    HEENT:  Eyes:  No diplopia or blurred vision. ENT:  No earache, sore throat or runny nose.    CARDIOVASCULAR:  No pressure, squeezing, tightness, or heaviness about the chest; no palpitations.    RESPIRATORY:  Per HPI    GASTROINTESTINAL:  No abdominal pain, nausea, vomiting or diarrhea.    GENITOURINARY:  No dysuria, frequency or urgency.    NEUROLOGIC:  No paresthesias, fasciculations, seizures or weakness.    PSYCHIATRIC:  No disorder of thought or mood.    Vital Signs Last 24 Hrs  T(C): 36.7 (2020 11:11), Max: 37 (2020 23:38)  T(F): 98 (2020 11:11), Max: 98.6 (2020 23:38)  HR: 71 (2020 08:00) (70 - 104)  BP: 106/67 (2020 08:00) (101/60 - 126/88)  BP(mean): --  RR: 18 (2020 11:11) (16 - 18)  SpO2: 98% (2020 11:11) (96% - 99%)  I&O's Detail      PHYSICAL EXAMINATION:    GENERAL: The patient is a well-developed, well-nourished _____in no apparent distress.     HEENT: Head is normocephalic and atraumatic. Extraocular muscles are intact. Mucous membranes are moist.     NECK: Supple.     LUNGS: Clear to auscultation without wheezing, rales, or rhonchi. Respirations unlabored    HEART: Regular rate and rhythm without murmur.    ABDOMEN: Soft, nontender, and nondistended.  No hepatosplenomegaly is noted.    EXTREMITIES: Without any cyanosis, clubbing, rash, lesions or edema.    NEUROLOGIC: Grossly intact.      LABS:                        11.8   7.31  )-----------( 271      ( 2020 07:08 )             36.9         137  |  104  |  23<H>  ----------------------------<  109<H>  3.8   |  27  |  0.72    Ca    8.6      2020 07:08  Phos  4.2       Mg     2.5         TPro  7.0  /  Alb  3.1<L>  /  TBili  0.4  /  DBili  x   /  AST  39  /  ALT  96<H>  /  AlkPhos  156<H>      PT/INR - ( 2020 16:34 )   PT: 12.9 sec;   INR: 1.09 ratio         PTT - ( 2020 16:34 )  PTT:30.9 sec  Urinalysis Basic - ( 2020 19:17 )    Color: Yellow / Appearance: Clear / S.015 / pH: x  Gluc: x / Ketone: Negative  / Bili: Negative / Urobili: Negative   Blood: x / Protein: Negative / Nitrite: Negative   Leuk Esterase: Trace / RBC: 2-5 /HPF / WBC 6-10 /HPF   Sq Epi: x / Non Sq Epi: Moderate /HPF / Bacteria: Few /HPF        CARDIAC MARKERS ( 2020 23:34 )  <0.015 ng/mL / x     / x     / x     / x      CARDIAC MARKERS ( 2020 16:34 )  <0.015 ng/mL / x     / x     / x     / x            Serum Pro-Brain Natriuretic Peptide: 12 pg/mL (20 @ 07:08)  Serum Pro-Brain Natriuretic Peptide: 17 pg/mL (20 @ 16:34)    Lactate, Blood: 1.1 mmol/L (20 @ 00:22)  Lactate, Blood: 2.1 mmol/L (20 @ 18:05)      COVID-19 PCR . (20 @ 16:34)   COVID-19 PCR: NotDetec  MICROBIOLOGY:    RADIOLOGY & ADDITIONAL STUDIES:    CXR:  < from: Xray Chest 2 Views PA/Lat (20 @ 16:52) >  Impression: No evidence for acute pulmonary infiltrate, pleural effusion, or pneumothorax.    The cardiac silhouette is within normal limits.    The trachea is midline.    There is no pulmonary vascular congestion .    < end of copied text >    Ct scan chest:    < from: CT Head No Cont (20 @ 17:15) >  Impression:    No acute hemorrhage, mass effect or extra-axial collections.  Dense bilateral globus pallidus calcification may be incidental. Metabolic/toxic etiologies are not excluded.      < end of copied text >  ekg;    echo:

## 2020-11-05 NOTE — CONSULT NOTE ADULT - ASSESSMENT
1. Chest Pain  R/O ACS Protocol  ASA, Statin, BB  Cardio f/u   Echo  Tele monitoring   Neuro to eval; if cleared - Stress test.  DVT and GI PPX     2. Blurry Vision  CT head noted.   Neuro eval.   Carotid Duplex pending     3. SOB  R/O underlying bronchospasm  CXR negative   Former smoker  O2 Supp PRN   Bronchodilators PRN   PFTs as OP     4. HTN  Continue meds  Monitor BP

## 2020-11-05 NOTE — CONSULT NOTE ADULT - SUBJECTIVE AND OBJECTIVE BOX
CHIEF COMPLAINT:Patient is a 55y old  Female who presents with a chief complaint of chest pain.      HPI:  Patient, a 55 year old female from home, with PMH of HTN, stroke 6 years ago and CHF? presents to the ED with complaint of chest pain, shortness of breath and blurry vision. Patient complains of left sided chest pain that started yesterday, throbbing, initially 10 now improved to 7 in intensity after managed by ED, radiating to left shoulder. She took no medicines for the pain. She also complaints of SOB since yesterday that is present at rest and exertion. She can walk 3 blocks without getting short of breath and it is more when using stairs. Endorses orthopnea that improves with pillows. Denies fever, chills, cough, palpitations. Pt has also been having blurry vision which she describes as 'seeing crystals'. She had one episode yesterday lasting 30min and then today for 20-25min. This is associated with headache and dizziness but no blacking out or LOC.  Denies diarrhea, constipation, urinary issues, recent travel history.  She also reports swelling of feet and nausea. Pt states her fingers feel numb and weak.     In ED, pt tachycardiac to 104 on admission. EKG showed NSR. She received 325mg aspirin and 1L NS bolus.  Vital Signs Last 24 Hrs  T(C): 37 (2020 23:38), Max: 37 (2020 23:38)  T(F): 98.6 (2020 23:38), Max: 98.6 (2020 23:38)  HR: 78 (2020 23:38) (78 - 104)  BP: 101/60 (2020 23:38) (101/60 - 126/88)  BP(mean): --  RR: 18 (2020 23:38) (17 - 18)  SpO2: 98% (2020 23:38) (98% - 99%) (2020 22:04)      PAST MEDICAL & SURGICAL HISTORY:  Swelling of lower extremity    Cerebrovascular accident (CVA)    HTN (hypertension)    H/O breast implant    H/O  section        MEDICATIONS  (STANDING):  aspirin enteric coated 81 milliGRAM(s) Oral daily  atorvastatin 80 milliGRAM(s) Oral at bedtime  cefTRIAXone   IVPB      cefTRIAXone   IVPB 1000 milliGRAM(s) IV Intermittent once  enoxaparin Injectable 40 milliGRAM(s) SubCutaneous daily  metoprolol tartrate 12.5 milliGRAM(s) Oral two times a day    MEDICATIONS  (PRN):  acetaminophen   Tablet .. 650 milliGRAM(s) Oral every 6 hours PRN Moderate Pain (4 - 6)      FAMILY HISTORY: No hx of CAD        SOCIAL HISTORY:    [x ] Non-smoker    [x ] Alcohol-denies    Allergies    No Known Allergies    Intolerances    	    REVIEW OF SYSTEMS:  CONSTITUTIONAL: No fever, weight loss, or fatigue  EYES: No eye pain, visual disturbances, or discharge  ENT:  No difficulty hearing, tinnitus, vertigo; No sinus or throat pain  NECK: No pain or stiffness  RESPIRATORY: No cough, wheezing, chills or hemoptysis; No + Shortness of Breath  CARDIOVASCULAR: + chest pain, palpitations, passing out, dizziness, or leg swelling  GASTROINTESTINAL: No abdominal or epigastric pain. No nausea, vomiting, or hematemesis; No diarrhea or constipation. No melena or hematochezia.  GENITOURINARY: No dysuria, frequency, hematuria, or incontinence  NEUROLOGICAL: No headaches, memory loss, loss of strength, numbness, or tremors  SKIN: No itching, burning, rashes, or lesions   LYMPH Nodes: No enlarged glands  ENDOCRINE: No heat or cold intolerance; No hair loss  MUSCULOSKELETAL: No joint pain or swelling; No muscle, back, or extremity pain  PSYCHIATRIC: No depression, anxiety, mood swings, or difficulty sleeping  HEME/LYMPH: No easy bruising, or bleeding gums  ALLERGY AND IMMUNOLOGIC: No hives or eczema	    PHYSICAL EXAM:  T(C): 36.4 (20 @ 08:00), Max: 37 (20 @ 23:38)  HR: 71 (20 @ 08:00) (70 - 104)  BP: 106/67 (20 @ 08:00) (101/60 - 126/88)  RR: 18 (20 @ 08:00) (16 - 18)  SpO2: 98% (20 @ 08:00) (96% - 99%)        Appearance: Normal	  HEENT:   Normal oral mucosa, PERRL, EOMI	  Lymphatic: No lymphadenopathy  Cardiovascular: Normal S1 S2, No JVD, No murmurs, No edema  Respiratory: Lungs clear to auscultation	  Psychiatry: A & O x 3, Mood & affect appropriate  Gastrointestinal:  Soft, Non-tender, + BS	  Skin: No rashes, No ecchymoses, No cyanosis	  Neurologic: Non-focal  Extremities: Normal range of motion, No clubbing, cyanosis or edema  Vascular: Peripheral pulses palpable 2+ bilaterally        ECG:  	NSR with nl axis  	  	  LABS:	 	      CARDIAC MARKERS ( 2020 23:34 )  <0.015 ng/mL / x     / x     / x     / x      CARDIAC MARKERS ( 2020 16:34 )  <0.015 ng/mL / x     / x     / x     / x                            11.8   7.31  )-----------( 271      ( 2020 07:08 )             36.9         137  |  104  |  23<H>  ----------------------------<  109<H>  3.8   |  27  |  0.72    Ca    8.6      2020 07:08  Phos  4.2       Mg     2.5         TPro  7.0  /  Alb  3.1<L>  /  TBili  0.4  /  DBili  x   /  AST  39  /  ALT  96<H>  /  AlkPhos  156<H>      proBNP: Serum Pro-Brain Natriuretic Peptide: 12 pg/mL ( @ 07:08)  Serum Pro-Brain Natriuretic Peptide: 17 pg/mL ( @ 16:34)    Lipid Profile: Cholesterol 175  LDL --  HDL 47        TSH: Thyroid Stimulating Hormone, Serum: 4.00 uU/mL ( @ 07:08)      ho< from: CT Head No Cont (20 @ 17:15) >  EXAM:  CT BRAIN                            PROCEDURE DATE:  2020          INTERPRETATION:  Noncontrast CT of the brain.    CLINICAL INDICATION:  Blurry vision    TECHNIQUE : Axial CT scanning of the brain was obtained from the skull base to the vertex without the administration of intravenous contrast.    COMPARISON: None available    FINDINGS:  No acute hemorrhage, hydrocephalus, midline shift or extra-axial collections are identified. Dense bilateral globus pallidus calcifications are present.    The orbits are not remarkable in appearance.    The visualized paranasal sinuses and tympanomastoid cavities are free of acute disease.    Impression:    No acute hemorrhage, mass effect or extra-axial collections.  Dense bilateral globus pallidus calcification may be incidental. Metabolic/toxic etiologies are not excluded.    < end of copied text >

## 2020-11-05 NOTE — PROGRESS NOTE ADULT - PROBLEM SELECTOR PLAN 1
EKG showed NSR  Trop neg x 2   f/u echo  Telemetry monitoring  Cardio consult Dr Demarco EKG showed NSR  Trop neg x 2   f/u echo  Stress test if cleared by neuro  Telemetry monitoring  Cardio consult Dr Demarco

## 2020-11-05 NOTE — CONSULT NOTE ADULT - ASSESSMENT
56yo female w/ transient blurry vision and abnormal CT scan 54yo female w/ transient blurry vision, abnormal CT scan, and PE c/w Polyarthritis    Recommendations:    -Abnormal CT by be evidence of Fahr's disease.  Therefore, check PTH and recommend pt f/u w/ outpt Endocrinologist or Neurologist for further workup.    -Continue mgmt per primary team 56yo female w/ transient blurry vision, abnormal CT scan, and PE c/w Polyarthritis    Recommendations:    -Abnormal CT may be evidence of Fahr's disease.  Therefore, check PTH and recommend pt f/u w/ outpt Endocrinologist or Neurologist for further workup.    -Continue mgmt per primary team 56yo female w/ transient blurry vision, abnormal CT scan, and physical exam c/w Polyarthritis    Recommendations:    -Abnormal CT may be evidence of Fahr's disease.  Therefore, check PTH and recommend pt f/u w/ outpt Endocrinologist or Neurologist for further workup.    -Continue mgmt per primary team 56yo female w/ transient blurry vision, abnormal CT scan, and physical exam c/w Polyarthritis    Recommendations:    -Abnormal CT may be evidence of Fahr disease.    [SEE ATTENDING ATTESTATION]  Therefore, check PTH and recommend pt f/u w/ outpt Endocrinologist or Neurologist for further workup.    -Continue mgmt per primary team

## 2020-11-05 NOTE — PROGRESS NOTE ADULT - PROBLEM SELECTOR PLAN 7
Reported h/o LE swelling  Monitor Downtrending  pt does not have any abdominal pain  Consider RUQ u/s if persistent elevation  Trend LFTs

## 2020-11-05 NOTE — CONSULT NOTE ADULT - ASSESSMENT
55 year old female from home, with PMH of HTN, stroke 6 years ago and CHF? presents to the ED with complaint of chest pain, shortness of breath and blurry vision.   1.Tele monitoring.  2.Echocardiogram.  3.Neurology eval.  4.HTN-cont bp medication.  5.Lipid d/o-statin.  6.If cleared by neurology, stress test in am.  7.GI and DVT prophylaxis.

## 2020-11-05 NOTE — PROGRESS NOTE ADULT - PROBLEM SELECTOR PLAN 8
[ ] Previous VTE                                    3  [ ] Thrombophilia                                  2  [ ] Lower limb paralysis                        2  (unable to hold up >15 seconds)    [ ] Current Cancer (within 6 months)        2   [x] Immobilization > 24 hrs                    1  [ ] ICU/CCU stay > 24 hrs                      1  [ ] Age > 60                                         1    VTE score:1  start lovenox ppx  GI ppx: Not indicated  Diet: Regular  Electrolytes replaced PRN  Dispo: Home  FULL CODE Reported h/o LE swelling  Monitor

## 2020-11-05 NOTE — PROGRESS NOTE ADULT - PROBLEM SELECTOR PLAN 2
Intermittent blurry vision episodes along with dizziness  NIHSS 0   Hx of CVA  CTH shows dense bilateral globus pallidus calcification.   Orthostatics negative  C/w aspirin and statin   f/u carotid doppler  Neurology Dr. Trejo

## 2020-11-05 NOTE — PROGRESS NOTE ADULT - SUBJECTIVE AND OBJECTIVE BOX
Patient is a 55y old  Female who presents with a chief complaint of chest pain (2020 22:04)    pt seen in icu [  ], reg med floor [   ], bed [  ], chair at bedside [   ], a+o x3 [  ], lethargic [  ],  nad [  ]    christensen [  ], ngt [  ], peg [  ], et tube [  ], cent line [  ], picc line [  ]        Allergies    No Known Allergies        Vitals    T(F): 97.5 (20 @ 05:05), Max: 98.6 (20 @ 23:38)  HR: 70 (20 @ 05:05) (70 - 104)  BP: 114/61 (20 @ 05:05) (101/60 - 126/88)  RR: 16 (20 @ 05:05) (16 - 18)  SpO2: 98% (20 @ 05:05) (96% - 99%)  Wt(kg): --  CAPILLARY BLOOD GLUCOSE          Labs                          13.4   9.66  )-----------( 280      ( 2020 16:34 )             41.5       11-    137  |  101  |  17  ----------------------------<  87  3.8   |  29  |  0.84    Ca    9.4      2020 16:34  Mg     2.5     11-    TPro  8.2  /  Alb  3.8  /  TBili  0.5  /  DBili  x   /  AST  49<H>  /  ALT  125<H>  /  AlkPhos  178<H>  11-04      CARDIAC MARKERS ( 2020 23:34 )  <0.015 ng/mL / x     / x     / x     / x      CARDIAC MARKERS ( 2020 16:34 )  <0.015 ng/mL / x     / x     / x     / x                Radiology Results      Meds    MEDICATIONS  (STANDING):  aspirin enteric coated 81 milliGRAM(s) Oral daily  atorvastatin 80 milliGRAM(s) Oral at bedtime  enoxaparin Injectable 40 milliGRAM(s) SubCutaneous daily  metoprolol tartrate 12.5 milliGRAM(s) Oral two times a day      MEDICATIONS  (PRN):  acetaminophen   Tablet .. 650 milliGRAM(s) Oral every 6 hours PRN Moderate Pain (4 - 6)      Physical Exam    Neuro :  no focal deficits  Respiratory: CTA B/L  CV: RRR, S1S2, no murmurs,   Abdominal: Soft, NT, ND +BS,  Extremities: No edema, + peripheral pulses    ASSESSMENT    Chest pain    Swelling of lower extremity    Cerebrovascular accident (CVA)    HTN (hypertension)    H/O breast implant    H/O  section        PLAN     Patient is a 55y old  Female who presents with a chief complaint of chest pain (2020 22:04)    pt seen in tele [ x ], reg med floor [   ], bed [ x ], chair at bedside [   ], a+o x3 [ x ], lethargic [  ],  nad [ x ]      Allergies    No Known Allergies        Vitals    T(F): 97.5 (20 @ 05:05), Max: 98.6 (20 @ 23:38)  HR: 70 (20 @ 05:05) (70 - 104)  BP: 114/61 (20 @ 05:05) (101/60 - 126/88)  RR: 16 (20 @ 05:05) (16 - 18)  SpO2: 98% (20 @ 05:05) (96% - 99%)  Wt(kg): --  CAPILLARY BLOOD GLUCOSE          Labs                          13.4   9.66  )-----------( 280      ( 2020 16:34 )             41.5       11-    137  |  101  |  17  ----------------------------<  87  3.8   |  29  |  0.84    Ca    9.4      2020 16:34  Mg     2.5     11-    TPro  8.2  /  Alb  3.8  /  TBili  0.5  /  DBili  x   /  AST  49<H>  /  ALT  125<H>  /  AlkPhos  178<H>  11-04      CARDIAC MARKERS ( 2020 23:34 )  <0.015 ng/mL / x     / x     / x     / x      CARDIAC MARKERS ( 2020 16:34 )  <0.015 ng/mL / x     / x     / x     / x                Radiology Results      < from: CT Head No Cont (20 @ 17:15) >  Impression:    No acute hemorrhage, mass effect or extra-axial collections.  Dense bilateral globus pallidus calcification may be incidental. Metabolic/toxic etiologies are not excluded.    < end of copied text >        Meds    MEDICATIONS  (STANDING):  aspirin enteric coated 81 milliGRAM(s) Oral daily  atorvastatin 80 milliGRAM(s) Oral at bedtime  enoxaparin Injectable 40 milliGRAM(s) SubCutaneous daily  metoprolol tartrate 12.5 milliGRAM(s) Oral two times a day      MEDICATIONS  (PRN):  acetaminophen   Tablet .. 650 milliGRAM(s) Oral every 6 hours PRN Moderate Pain (4 - 6)      Physical Exam    Neuro :  no focal deficits  Respiratory: CTA B/L  CV: RRR, S1S2, no murmurs,   Abdominal: Soft, NT, ND +BS,  Extremities: No edema, + peripheral pulses    ASSESSMENT    Chest pain  r/o acs   blurred vision r/o cns patho,   sobr/o bronchospasm,   h/o HTN,   Swelling of lower extremity  Cerebrovascular accident (CVA)  breast implant   section        PLAN      cont tele,   cont aspirin, statin, lopressor,   cardio cons  ce q8 x 2 neg noted above   f/u echo  f/u stress test  carotid duplex  neuro cons.  ct head with No acute hemorrhage, mass effect or extra-axial collections. Dense bilateral globus pallidus calcification may be incidental. Metabolic/toxic etiologies are not excluded noted above.  cont current meds

## 2020-11-05 NOTE — PROGRESS NOTE ADULT - ASSESSMENT
Patient, a 55 year old female from home, with PMH of HTN, stroke 6 years ago and CHF? presents to the ED with complaint of chest pain, shortness of breath and blurry vision since yesterday, CTH showed no acute hemorrhage, mass effect or extra-axial collection, dense bilateral globus pallidus calcification.    Patient admitted to tele for evaluation of chest pain and stroke workup.

## 2020-11-05 NOTE — PROGRESS NOTE ADULT - PROBLEM SELECTOR PLAN 3
On Losartan/HCTZ at home  started on low dose lopressor  Hold other medications for now as BP's acceptable  monitor bp

## 2020-11-05 NOTE — CONSULT NOTE ADULT - ATTENDING COMMENTS
I personally interviewed, examined, and participated in the care of this patient, on rounds 11/5/20 with NP Johny Mata.  Pt interviewed by me in Croatian.  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  She has episodes of finger joint and wrist swelling and pain.  She has recurring L shoulder pain, costosternal pain (L>R), and upper left anterior chest wall pain.  She can get costo-sternal pain with deep inspiration.      Six years ago she had an episode of dry mouth, lightheadedness, blurred vision, during which episode she apparently [place] out [place] subway train.  Next remembers being in hospital ([facility]) where she was told she had had a "stroke."  She doesn't know if she was told this in Croatian by a physician fluent in Croatian, or whether she was told this via  (mis-translation?) phone service.  She quite certain that neither then or at any other time did she have amaurosis (as opposed to blurred vision), hemiparesis, or a hemisensory disturbance, transient or prolonged.      EXAMINATION            On examination I note in particular, or in addition to or instead of the above, as follows:      Overweight.  Awake, alert.  Normal comprehension, expression, prosody, articulation, and rate of speaking, in Croatian.  PERRL; EOMI; no UE drift.  Luis symmetric and normal 4 limbs.      Fairly severe pain L shoulder on passive abduction and rotation, and on palpation of humeral head.  L trapezius tender to palpation (TTP) especially along superior medial portion.  SCMs bilat quite TTP L>R, and worst at mastoid insertions.  Costo-sternal articulations quite TTP.   Mild TTP of lower cervical spinous processes.     Normal tonus four limbs.  No tremor or postural instability.  Normal facial expressivity.  No evident extrapyramidal signs.         DISCUSSION and RECOMMENDATIONS    In retrospect it seems highly unlikely she had a stroke 6 years ago, at age 49!!  What she describes was a syncopal episode.    Polyarthralgia.    Cervical regional myofascial pain syndrome.  L shoulder arthropathy.  Cervicalgia.    The benefit of ASA for secondary stroke prevention is an ~ 10% reduction in risk of first/repeat stroke in a period of months to at most ~2 years after initial TIA/stroke.  The benefit of indefinite antiplatelet administration after stroke/TIA is conjectural and unproven.  THERE IS NO BENEFIT, ONLY RISK, IN THE USE OF ASPIRIN FOR PRIMARY STROKE PREVENTION.     THEREFORE, in this lady who it seems never had a stroke:    If there is no cardiac or other medical reason for daily ASA being given to this Pt, please D/C ASA.    If she does not have dyslipidemia please D/C statin therapy, which is not a benign treatment.    To justify continued ASA/statin for neurologic reason(s) we recommend getting records from [facility] to verify the stroke Dx.  Also, if she had had a stroke so young, she should have had a suitable lab work-up:      ESR, CRP, HELENA, SPEP, Hgb A1c, serum immunofixation, methylmalonic acid, homocysteine, anti DS-DNA, complement C3 and C4, coagulopathy panel, syphilis serology.    Was/is she taking oral contraceptives?  If so should avoid.           ++++++++++++++++++++++++++++++++++++++++++++++++++++++    Without extrapyramidal findings, chorea, or dystonia, etc., we cannot say she has Fahr disease/syndrome (primary familial brain calcification).  Other possibilities include sporadic idiopathic basal ganglia calcifications, hypoparathroidism, and paseudoparahypothyroidism.  Recommend parathormone level and endocrine referral (can be out-Pt). I personally interviewed, examined, and participated in the care of this patient, on rounds 11/5/20 with NP Johny Mata.  Pt interviewed by me in Maltese.  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  She has episodes of finger joint and wrist swelling and pain.  She has recurring L shoulder pain, costosternal pain (L>R), and upper left anterior chest wall pain.  She can get costo-sternal pain with deep inspiration.      Six years ago she had an episode of dry mouth, lightheadedness, blurred vision, during which episode she apparently [place] subway [place].  Next remembers being in hospital ([facility]) where she was told she had had a "stroke."  She doesn't know if she was told this in Maltese by a physician fluent in Maltese, or whether she was told this via  (mis-translation?) phone service.  She quite certain that neither then or at any other time did she have amaurosis (as opposed to blurred vision), hemiparesis, or a hemisensory disturbance, transient or prolonged.      EXAMINATION            On examination I note in particular, or in addition to or instead of the above, as follows:      Overweight.  Awake, alert.  Normal comprehension, expression, prosody, articulation, and rate of speaking, in Maltese.  PERRL; EOMI; no UE drift.  Luis symmetric and normal 4 limbs.      Fairly severe pain L shoulder on passive abduction and rotation, and on palpation of humeral head.  L trapezius tender to palpation (TTP) especially along superior medial portion.  SCMs bilat quite TTP L>R, and worst at mastoid insertions.  Costo-sternal articulations quite TTP.   Mild TTP of lower cervical spinous processes.     Normal tonus four limbs.  No tremor or postural instability.  Normal facial expressivity.  No evident extrapyramidal signs.         DISCUSSION and RECOMMENDATIONS    In retrospect it seems highly unlikely she had a stroke 6 years ago, at age 49!!                 What she describes was a syncopal episode.    Polyarthralgia.    Cervical regional myofascial pain syndrome.  L shoulder arthropathy.  Cervicalgia.    The benefit of ASA for secondary stroke prevention is an ~ 10% reduction in risk of first/repeat stroke in a period of months to at most ~2 years after initial TIA/stroke.  The benefit of indefinite antiplatelet administration after stroke/TIA is conjectural and unproven.  THERE IS NO BENEFIT, ONLY RISK, IN THE USE OF ASPIRIN FOR PRIMARY STROKE PREVENTION.     THEREFORE, in this lady who it seems never had a stroke:    If there is no cardiac or other medical reason for daily ASA being given to this Pt, please D/C ASA.    If she does not have dyslipidemia please D/C statin therapy, which is not a benign treatment.    To justify continued ASA/statin for neurologic reason(s) we recommend getting records from [facility] to verify the stroke Dx.  Also, if she had had a stroke so young, she should have had a suitable lab work-up:      ESR, CRP, HELENA, SPEP, Hgb A1c, serum immunofixation, methylmalonic acid, homocysteine, anti DS-DNA, complement C3 and C4, coagulopathy panel, syphilis serology.    Was/is she taking oral contraceptives?  If so should avoid.           ++++++++++++++++++++++++++++++++++++++++++++++++++++++    Without extrapyramidal findings, chorea, or dystonia, etc., we cannot say she has Fahr disease/syndrome (primary familial brain calcification).  Other possibilities include sporadic idiopathic basal ganglia calcifications, hypoparathroidism, and paseudoparahypothyroidism.  Recommend parathormone level and endocrine referral (can be out-Pt). I personally interviewed, examined, and participated in the care of this patient, on rounds 11/5/20 with NP Johny Mata.  Pt interviewed by me in Filipino.  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  She has episodes of finger joint and wrist swelling and pain.  She has recurring L shoulder pain, costosternal pain (L>R), and upper left anterior chest wall pain.  She can get costo-sternal pain with deep inspiration.      Six years ago she had an episode of dry mouth, lightheadedness, blurred vision, during which episode she [place] subway [place].  Next remembers being in hospital ([facility]) where she was told she had had a "stroke."  She doesn't know if she was told this in Filipino by a physician fluent in Filipino, or whether she was told this via  (mis-translation?) phone service.  She quite certain that neither then or at any other time did she have amaurosis (as opposed to blurred vision), hemiparesis, or a hemisensory disturbance, transient or prolonged.  Was D/C'ed w Rx for Losartan.        EXAMINATION          On examination I note in particular, or in addition to or instead of the above, as follows:      Overweight.  Awake, alert.  Normal comprehension, expression, prosody, articulation, and rate of speaking, in Filipino.  PERRL; EOMI; no UE drift.  Luis symmetric and normal 4 limbs.      Fairly severe pain L shoulder on passive abduction and rotation, and on palpation of humeral head.  L trapezius tender to palpation (TTP) especially along superior medial portion.  SCMs bilat quite TTP L>R, and worst at mastoid insertions.  Costo-sternal articulations quite TTP.   Mild TTP of lower cervical spinous processes.     Normal tonus four limbs.  No tremor or postural instability.  Normal facial expressivity.  No evident extrapyramidal signs.         DISCUSSION and RECOMMENDATIONS    In retrospect it seems highly unlikely she had a stroke 6 years ago, at age 49!!                 What she describes was more likley a syncopal episode.    Polyarthralgia.    Cervical regional myofascial pain syndrome.  L shoulder arthropathy.  Cervicalgia.    The benefit of ASA for secondary stroke prevention is an ~ 10% reduction in risk of first/repeat stroke in a period of months to at most ~2 years after initial TIA/stroke.  The benefit of indefinite antiplatelet administration after stroke/TIA is conjectural and unproven.  THERE IS NO BENEFIT, ONLY RISK, IN THE USE OF ASPIRIN FOR PRIMARY STROKE PREVENTION.     THEREFORE, in this lady who it seems never had a stroke:    If there is no cardiac or other medical reason for daily ASA being given to this Pt, please D/C ASA.    If she does not have dyslipidemia please D/C statin therapy, which is not a benign medication.    To justify continued ASA/statin for neurologic reason(s) we recommend getting records from [facility] to verify the stroke Dx.  Also, if she had had a stroke so young, she should have had a suitable lab work-up.  Furthermore, even if she had the work-up then, it should be repeated especially because some autoimmune disorders may be seronegative early on.    ESR, CRP, HELENA, SPEP, RF, CCP, Hgb A1c, serum immunofixation, methylmalonic acid, homocysteine, anti DS-DNA, complement C3 and C4, coagulopathy panel (factor V Leiden, etc.), syphilis serology.    Was/is she taking oral contraceptives?  If so she should avoid.      ++++++++++++++++++++++++++++++++++++++++++++++++++++++    Without extrapyramidal findings, chorea, or dystonia, etc., we cannot say she has Fahr disease/syndrome (primary familial brain calcification).  Other possibilities include sporadic idiopathic basal ganglia calcifications, hypoparathroidism, and pseudohypothyroidism.  Recommend parathormone level and endocrine referral (can be out-Pt).    If the question of whether she really had a stroke 6 years ago cannot re resolved during this hospitalization would have [facility] records requested and sent to out-Pt neurologist.

## 2020-11-06 VITALS
TEMPERATURE: 98 F | HEART RATE: 88 BPM | DIASTOLIC BLOOD PRESSURE: 78 MMHG | RESPIRATION RATE: 18 BRPM | SYSTOLIC BLOOD PRESSURE: 132 MMHG | OXYGEN SATURATION: 96 %

## 2020-11-06 LAB
ALBUMIN SERPL ELPH-MCNC: 2.9 G/DL — LOW (ref 3.5–5)
ALP SERPL-CCNC: 147 U/L — HIGH (ref 40–120)
ALT FLD-CCNC: 97 U/L DA — HIGH (ref 10–60)
ANION GAP SERPL CALC-SCNC: 4 MMOL/L — LOW (ref 5–17)
AST SERPL-CCNC: 43 U/L — HIGH (ref 10–40)
BILIRUB SERPL-MCNC: 0.3 MG/DL — SIGNIFICANT CHANGE UP (ref 0.2–1.2)
BUN SERPL-MCNC: 18 MG/DL — SIGNIFICANT CHANGE UP (ref 7–18)
C3 SERPL-MCNC: 139 MG/DL — SIGNIFICANT CHANGE UP (ref 81–157)
C4 SERPL-MCNC: 34 MG/DL — SIGNIFICANT CHANGE UP (ref 13–39)
CA-I BLD-SCNC: 1.3 MMOL/L — SIGNIFICANT CHANGE UP (ref 1.12–1.3)
CALCIUM SERPL-MCNC: 9 MG/DL — SIGNIFICANT CHANGE UP (ref 8.4–10.5)
CALCIUM SERPL-MCNC: 9.3 MG/DL — SIGNIFICANT CHANGE UP (ref 8.4–10.5)
CHLORIDE SERPL-SCNC: 107 MMOL/L — SIGNIFICANT CHANGE UP (ref 96–108)
CO2 SERPL-SCNC: 28 MMOL/L — SIGNIFICANT CHANGE UP (ref 22–31)
CREAT SERPL-MCNC: 0.68 MG/DL — SIGNIFICANT CHANGE UP (ref 0.5–1.3)
CRP SERPL-MCNC: 0.98 MG/DL — HIGH (ref 0–0.4)
ERYTHROCYTE [SEDIMENTATION RATE] IN BLOOD: 23 MM/HR — HIGH (ref 0–20)
GLUCOSE SERPL-MCNC: 97 MG/DL — SIGNIFICANT CHANGE UP (ref 70–99)
HCT VFR BLD CALC: 36.5 % — SIGNIFICANT CHANGE UP (ref 34.5–45)
HGB BLD-MCNC: 11.6 G/DL — SIGNIFICANT CHANGE UP (ref 11.5–15.5)
MCHC RBC-ENTMCNC: 27.5 PG — SIGNIFICANT CHANGE UP (ref 27–34)
MCHC RBC-ENTMCNC: 31.8 GM/DL — LOW (ref 32–36)
MCV RBC AUTO: 86.5 FL — SIGNIFICANT CHANGE UP (ref 80–100)
NRBC # BLD: 0 /100 WBCS — SIGNIFICANT CHANGE UP (ref 0–0)
PLATELET # BLD AUTO: 260 K/UL — SIGNIFICANT CHANGE UP (ref 150–400)
POTASSIUM SERPL-MCNC: 3.9 MMOL/L — SIGNIFICANT CHANGE UP (ref 3.5–5.3)
POTASSIUM SERPL-SCNC: 3.9 MMOL/L — SIGNIFICANT CHANGE UP (ref 3.5–5.3)
PROT SERPL-MCNC: 6.1 G/DL — SIGNIFICANT CHANGE UP (ref 6–8.3)
PROT SERPL-MCNC: 6.1 G/DL — SIGNIFICANT CHANGE UP (ref 6–8.3)
PROT SERPL-MCNC: 6.7 G/DL — SIGNIFICANT CHANGE UP (ref 6–8.3)
PTH-INTACT FLD-MCNC: 24 PG/ML — SIGNIFICANT CHANGE UP (ref 15–65)
RBC # BLD: 4.22 M/UL — SIGNIFICANT CHANGE UP (ref 3.8–5.2)
RBC # FLD: 13.5 % — SIGNIFICANT CHANGE UP (ref 10.3–14.5)
RHEUMATOID FACT SERPL-ACNC: 14 IU/ML — HIGH (ref 0–13)
SARS-COV-2 IGG SERPL QL IA: POSITIVE
SARS-COV-2 IGM SERPL IA-ACNC: 11.5 INDEX — HIGH
SODIUM SERPL-SCNC: 139 MMOL/L — SIGNIFICANT CHANGE UP (ref 135–145)
T PALLIDUM AB TITR SER: NEGATIVE — SIGNIFICANT CHANGE UP
WBC # BLD: 7.15 K/UL — SIGNIFICANT CHANGE UP (ref 3.8–10.5)
WBC # FLD AUTO: 7.15 K/UL — SIGNIFICANT CHANGE UP (ref 3.8–10.5)

## 2020-11-06 PROCEDURE — 83921 ORGANIC ACID SINGLE QUANT: CPT

## 2020-11-06 PROCEDURE — 85025 COMPLETE CBC W/AUTO DIFF WBC: CPT

## 2020-11-06 PROCEDURE — 86780 TREPONEMA PALLIDUM: CPT

## 2020-11-06 PROCEDURE — 82607 VITAMIN B-12: CPT

## 2020-11-06 PROCEDURE — 93306 TTE W/DOPPLER COMPLETE: CPT

## 2020-11-06 PROCEDURE — 80307 DRUG TEST PRSMV CHEM ANLYZR: CPT

## 2020-11-06 PROCEDURE — 82330 ASSAY OF CALCIUM: CPT

## 2020-11-06 PROCEDURE — 80053 COMPREHEN METABOLIC PANEL: CPT

## 2020-11-06 PROCEDURE — 93017 CV STRESS TEST TRACING ONLY: CPT

## 2020-11-06 PROCEDURE — 70450 CT HEAD/BRAIN W/O DYE: CPT

## 2020-11-06 PROCEDURE — 86038 ANTINUCLEAR ANTIBODIES: CPT

## 2020-11-06 PROCEDURE — 86160 COMPLEMENT ANTIGEN: CPT

## 2020-11-06 PROCEDURE — 82746 ASSAY OF FOLIC ACID SERUM: CPT

## 2020-11-06 PROCEDURE — 87635 SARS-COV-2 COVID-19 AMP PRB: CPT

## 2020-11-06 PROCEDURE — 83970 ASSAY OF PARATHORMONE: CPT

## 2020-11-06 PROCEDURE — 83605 ASSAY OF LACTIC ACID: CPT

## 2020-11-06 PROCEDURE — 85730 THROMBOPLASTIN TIME PARTIAL: CPT

## 2020-11-06 PROCEDURE — 84443 ASSAY THYROID STIM HORMONE: CPT

## 2020-11-06 PROCEDURE — 83036 HEMOGLOBIN GLYCOSYLATED A1C: CPT

## 2020-11-06 PROCEDURE — 85610 PROTHROMBIN TIME: CPT

## 2020-11-06 PROCEDURE — 86140 C-REACTIVE PROTEIN: CPT

## 2020-11-06 PROCEDURE — 86225 DNA ANTIBODY NATIVE: CPT

## 2020-11-06 PROCEDURE — 86334 IMMUNOFIX E-PHORESIS SERUM: CPT

## 2020-11-06 PROCEDURE — 81001 URINALYSIS AUTO W/SCOPE: CPT

## 2020-11-06 PROCEDURE — 86431 RHEUMATOID FACTOR QUANT: CPT

## 2020-11-06 PROCEDURE — 84702 CHORIONIC GONADOTROPIN TEST: CPT

## 2020-11-06 PROCEDURE — 84155 ASSAY OF PROTEIN SERUM: CPT

## 2020-11-06 PROCEDURE — 93005 ELECTROCARDIOGRAM TRACING: CPT

## 2020-11-06 PROCEDURE — 83880 ASSAY OF NATRIURETIC PEPTIDE: CPT

## 2020-11-06 PROCEDURE — A9502: CPT

## 2020-11-06 PROCEDURE — 36415 COLL VENOUS BLD VENIPUNCTURE: CPT

## 2020-11-06 PROCEDURE — 99285 EMERGENCY DEPT VISIT HI MDM: CPT | Mod: 25

## 2020-11-06 PROCEDURE — 85652 RBC SED RATE AUTOMATED: CPT

## 2020-11-06 PROCEDURE — 71046 X-RAY EXAM CHEST 2 VIEWS: CPT

## 2020-11-06 PROCEDURE — 85027 COMPLETE CBC AUTOMATED: CPT

## 2020-11-06 PROCEDURE — 83735 ASSAY OF MAGNESIUM: CPT

## 2020-11-06 PROCEDURE — 83090 ASSAY OF HOMOCYSTEINE: CPT

## 2020-11-06 PROCEDURE — 80061 LIPID PANEL: CPT

## 2020-11-06 PROCEDURE — 86769 SARS-COV-2 COVID-19 ANTIBODY: CPT

## 2020-11-06 PROCEDURE — 82306 VITAMIN D 25 HYDROXY: CPT

## 2020-11-06 PROCEDURE — 84484 ASSAY OF TROPONIN QUANT: CPT

## 2020-11-06 PROCEDURE — 86200 CCP ANTIBODY: CPT

## 2020-11-06 PROCEDURE — 81025 URINE PREGNANCY TEST: CPT

## 2020-11-06 PROCEDURE — 78452 HT MUSCLE IMAGE SPECT MULT: CPT

## 2020-11-06 PROCEDURE — 93880 EXTRACRANIAL BILAT STUDY: CPT

## 2020-11-06 PROCEDURE — 84165 PROTEIN E-PHORESIS SERUM: CPT

## 2020-11-06 PROCEDURE — 82310 ASSAY OF CALCIUM: CPT

## 2020-11-06 PROCEDURE — 84100 ASSAY OF PHOSPHORUS: CPT

## 2020-11-06 RX ORDER — ERGOCALCIFEROL 1.25 MG/1
1 CAPSULE ORAL
Qty: 30 | Refills: 0
Start: 2020-11-06 | End: 2020-12-05

## 2020-11-06 RX ORDER — LOSARTAN/HYDROCHLOROTHIAZIDE 100MG-25MG
1 TABLET ORAL
Qty: 0 | Refills: 0 | DISCHARGE

## 2020-11-06 RX ORDER — LOVASTATIN 20 MG
1 TABLET ORAL
Qty: 0 | Refills: 0 | DISCHARGE

## 2020-11-06 RX ORDER — LORATADINE 10 MG/1
1 TABLET ORAL
Qty: 0 | Refills: 0 | DISCHARGE

## 2020-11-06 RX ORDER — ERGOCALCIFEROL 1.25 MG/1
50000 CAPSULE ORAL ONCE
Refills: 0 | Status: COMPLETED | OUTPATIENT
Start: 2020-11-06 | End: 2020-11-06

## 2020-11-06 RX ADMIN — Medication 81 MILLIGRAM(S): at 12:20

## 2020-11-06 RX ADMIN — Medication 12.5 MILLIGRAM(S): at 17:35

## 2020-11-06 RX ADMIN — LORATADINE 10 MILLIGRAM(S): 10 TABLET ORAL at 12:20

## 2020-11-06 RX ADMIN — Medication 1 TABLET(S): at 17:28

## 2020-11-06 RX ADMIN — ERGOCALCIFEROL 50000 UNIT(S): 1.25 CAPSULE ORAL at 12:20

## 2020-11-06 RX ADMIN — Medication 1 TABLET(S): at 17:27

## 2020-11-06 RX ADMIN — CEFTRIAXONE 100 MILLIGRAM(S): 500 INJECTION, POWDER, FOR SOLUTION INTRAMUSCULAR; INTRAVENOUS at 06:38

## 2020-11-06 RX ADMIN — ENOXAPARIN SODIUM 40 MILLIGRAM(S): 100 INJECTION SUBCUTANEOUS at 12:21

## 2020-11-06 NOTE — DISCHARGE NOTE NURSING/CASE MANAGEMENT/SOCIAL WORK - NSDCPEPTSTRK_GEN_ALL_CORE
Stroke education booklet/Stroke warning signs and symptoms/Need for follow up after discharge/Call 911 for stroke/Stroke support groups for patients, families, and friends/Risk factors for stroke/Prescribed medications/Signs and symptoms of stroke

## 2020-11-06 NOTE — DISCHARGE NOTE PROVIDER - NSFOLLOWUPCLINICS_GEN_ALL_ED_FT
[facility]  Neurology  [address] [place] [address]  Phone: [phone]  Fax: [phone]    [facility]  Endocrinology  [address] [place] [address]  Phone: [phone]  Fax: [phone]  Follow Up Time:

## 2020-11-06 NOTE — PROGRESS NOTE ADULT - ASSESSMENT
1. Chest Pain  R/O ACS Protocol  ASA, Statin, BB  Cardio f/u   Echo noted.   Tele monitoring   Neuro F/U   Stress test noted.  DVT and GI PPX   D/C planning pending cardiac workup completion.     2. Blurry Vision  CT head noted.   Neuro f/u.   Carotid Duplex noted.     3. SOB  R/O underlying bronchospasm  CXR negative   Former smoker  O2 Supp PRN   Bronchodilators PRN   PFTs as OP     4. HTN  Continue meds  Monitor BP

## 2020-11-06 NOTE — PROGRESS NOTE ADULT - SUBJECTIVE AND OBJECTIVE BOX
Patient is a 55y old  Female who presents with a chief complaint of chest pain (2020 13:44)    pt seen in tele [ x ], reg med floor [   ], bed [ x ], chair at bedside [   ], a+o x3 [ x ], lethargic [  ],    nad [ x ]      Allergies    No Known Allergies        Vitals    T(F): 97.7 (20 @ 04:35), Max: 98.2 (20 @ 15:51)  HR: 73 (20 @ 04:35) (71 - 86)  BP: 109/68 (20 @ 04:35) (106/67 - 119/73)  RR: 18 (20 @ 04:35) (18 - 18)  SpO2: 99% (20 @ 04:35) (96% - 99%)  Wt(kg): --  CAPILLARY BLOOD GLUCOSE          Labs                          11.6   7.15  )-----------( 260      ( 2020 06:25 )             36.5           137  |  104  |  23<H>  ----------------------------<  109<H>  3.8   |  27  |  0.72    Ca    8.6      2020 07:08  Phos  4.2       Mg     2.5         TPro  7.0  /  Alb  3.1<L>  /  TBili  0.4  /  DBili  x   /  AST  39  /  ALT  96<H>  /  AlkPhos  156<H>        CARDIAC MARKERS ( 2020 23:34 )  <0.015 ng/mL / x     / x     / x     / x      CARDIAC MARKERS ( 2020 16:34 )  <0.015 ng/mL / x     / x     / x     / x                Radiology Results      Meds    MEDICATIONS  (STANDING):  aspirin enteric coated 81 milliGRAM(s) Oral daily  atorvastatin 80 milliGRAM(s) Oral at bedtime  calcium carbonate 1250 mG  + Vitamin D (OsCal 500 + D) 1 Tablet(s) Oral daily  cefTRIAXone   IVPB      cefTRIAXone   IVPB 1000 milliGRAM(s) IV Intermittent every 24 hours  enoxaparin Injectable 40 milliGRAM(s) SubCutaneous daily  loratadine 10 milliGRAM(s) Oral daily  metoprolol tartrate 12.5 milliGRAM(s) Oral two times a day  multivitamin 1 Tablet(s) Oral daily      MEDICATIONS  (PRN):  acetaminophen   Tablet .. 650 milliGRAM(s) Oral every 6 hours PRN Moderate Pain (4 - 6)  ALBUTerol    90 MICROgram(s) HFA Inhaler 2 Puff(s) Inhalation every 6 hours PRN Shortness of Breath and/or Wheezing  ondansetron Injectable 4 milliGRAM(s) IV Push once PRN Nausea and/or Vomiting      Physical Exam    Neuro :  no focal deficits  Respiratory: CTA B/L  CV: RRR, S1S2, no murmurs,   Abdominal: Soft, NT, ND +BS,  Extremities: No edema, + peripheral pulses    ASSESSMENT    Chest pain  r/o acs   blurred vision r/o cns patho,   sobr/o bronchospasm,   h/o HTN,   Swelling of lower extremity  Cerebrovascular accident (CVA)  breast implant   section        PLAN      cont tele,   cont aspirin, statin, lopressor,   cardio cons  ce q8 x 2 neg noted above   f/u echo  f/u stress test  carotid duplex  neuro cons.  ct head with No acute hemorrhage, mass effect or extra-axial collections. Dense bilateral globus pallidus calcification may be incidental. Metabolic/toxic etiologies are not excluded noted above.  cont current meds         Patient is a 55y old  Female who presents with a chief complaint of chest pain (2020 13:44)    pt seen in tele [ x ], reg med floor [   ], bed [ x ], chair at bedside [   ], a+o x3 [ x ], lethargic [  ],    nad [ x ]      Allergies    No Known Allergies        Vitals    T(F): 97.7 (20 @ 04:35), Max: 98.2 (20 @ 15:51)  HR: 73 (20 @ 04:35) (71 - 86)  BP: 109/68 (20 @ 04:35) (106/67 - 119/73)  RR: 18 (20 @ 04:35) (18 - 18)  SpO2: 99% (20 @ 04:35) (96% - 99%)  Wt(kg): --  CAPILLARY BLOOD GLUCOSE          Labs                          11.6   7.15  )-----------( 260      ( 2020 06:25 )             36.5           137  |  104  |  23<H>  ----------------------------<  109<H>  3.8   |  27  |  0.72    Ca    8.6      2020 07:08  Phos  4.2       Mg     2.5         TPro  7.0  /  Alb  3.1<L>  /  TBili  0.4  /  DBili  x   /  AST  39  /  ALT  96<H>  /  AlkPhos  156<H>        CARDIAC MARKERS ( 2020 23:34 )  <0.015 ng/mL / x     / x     / x     / x      CARDIAC MARKERS ( 2020 16:34 )  <0.015 ng/mL / x     / x     / x     / x          < from: Transthoracic Echocardiogram (20 @ 09:03) >  CONCLUSIONS:  1. Normal mitral valve. Trace mitral regurgitation.  2. Aortic valve not well visualized. No aortic stenosis.  Trace aortic regurgitation.  3. Normal aortic root.  4. Normal left atrium.  5. Normal left ventricular internal dimensions and wall  thicknesses.  6. Normal Left Ventricular Systolic Function,  (EF = 62% by  biplane)  7. Normal diastolic function.  8. Normal right atrium.  9. Normal right ventricular size and systolic function  (TAPSE 2.6 cm).  10. Normal tricuspid valve. Trace tricuspid regurgitation.  11. Pulmonic valve not well seen. Trace pulmonic  insufficiency is noted.  12. No pericardial effusion.    < end of copied text >        Radiology Results      Meds    MEDICATIONS  (STANDING):  aspirin enteric coated 81 milliGRAM(s) Oral daily  atorvastatin 80 milliGRAM(s) Oral at bedtime  calcium carbonate 1250 mG  + Vitamin D (OsCal 500 + D) 1 Tablet(s) Oral daily  cefTRIAXone   IVPB      cefTRIAXone   IVPB 1000 milliGRAM(s) IV Intermittent every 24 hours  enoxaparin Injectable 40 milliGRAM(s) SubCutaneous daily  loratadine 10 milliGRAM(s) Oral daily  metoprolol tartrate 12.5 milliGRAM(s) Oral two times a day  multivitamin 1 Tablet(s) Oral daily      MEDICATIONS  (PRN):  acetaminophen   Tablet .. 650 milliGRAM(s) Oral every 6 hours PRN Moderate Pain (4 - 6)  ALBUTerol    90 MICROgram(s) HFA Inhaler 2 Puff(s) Inhalation every 6 hours PRN Shortness of Breath and/or Wheezing  ondansetron Injectable 4 milliGRAM(s) IV Push once PRN Nausea and/or Vomiting      Physical Exam    Neuro :  no focal deficits  Respiratory: CTA B/L  CV: RRR, S1S2, no murmurs,   Abdominal: Soft, NT, ND +BS,  Extremities: No edema, + peripheral pulses    ASSESSMENT    Chest pain  r/o acs   blurred vision r/o cns patho,   sobr/o bronchospasm,   h/o HTN,   Swelling of lower extremity  Cerebrovascular accident (CVA)  breast implant   section        PLAN      cont tele,   cont aspirin, statin, lopressor,   cardio f/u  ce q8 x 2 neg noted above   echo with Normal Left Ventricular Systolic Function, EF = 62% noted above  f/u stress test  carotid duplex  neuro cons noted  recommend pt f/u w/ outpt Endocrinologist or Neurologist for further workup.  ct head with No acute hemorrhage, mass effect or extra-axial collections. Dense bilateral globus pallidus calcification may be incidental. Metabolic/toxic etiologies are not excluded noted.  cont current meds  d/c plan if stress test neg

## 2020-11-06 NOTE — DISCHARGE NOTE PROVIDER - HOSPITAL COURSE
Patient, a 55 year old female from home, with PMH of HTN, stroke 6 years ago and CHF? presents to the ED with complaint of chest pain, shortness of breath and blurry vision. Patient complains of left sided chest pain that started yesterday, throbbing, initially 10 now improved to 7 in intensity after managed by ED, radiating to left shoulder. She took no medicines for the pain. She also complaints of SOB since yesterday that is present at rest and exertion. She can walk 3 blocks without getting short of breath and it is more when using stairs. Endorses orthopnea that improves with pillows. Denies fever, chills, cough, palpitations. Pt has also been having blurry vision which she describes as 'seeing crystals'. She had one episode yesterday lasting 30min and then today for 20-25min. This is associated with headache and dizziness but no blacking out or LOC.  Denies diarrhea, constipation, urinary issues, recent travel history.  She also reports swelling of feet and nausea. Pt states her fingers feel numb and weak.     In ED, pt tachycardiac to 104 on admission. EKG showed NSR. She received 325mg aspirin and 1L NS bolus.    CT head showed globus pallidius calcification. Carotid dopplers with no evidence of stenosis. ECHO EF 62% no other acute findings. Patient was monitored on telemetry with no acute events. Stress test had no evidence of ischemia.     Per neurology - symptoms very unlikely to be stroke. No further inpatient workup for CT scan findings given lack of other neurologic symptoms - can see neurology and endocrinology outpt.    ASCVD risk score 1.6% - no need for aspirin or statin at this time.     Pt stable to be discharged. Patient, a 55 year old female from home, with PMH of HTN, stroke 6 years ago and CHF? presents to the ED with complaint of chest pain, shortness of breath and blurry vision. Patient complains of left sided chest pain that started yesterday, throbbing, initially 10 now improved to 7 in intensity after managed by ED, radiating to left shoulder. She took no medicines for the pain. She also complaints of SOB since yesterday that is present at rest and exertion. She can walk 3 blocks without getting short of breath and it is more when using stairs. Endorses orthopnea that improves with pillows. Denies fever, chills, cough, palpitations. Pt has also been having blurry vision which she describes as 'seeing crystals'. She had one episode yesterday lasting 30min and then today for 20-25min. This is associated with headache and dizziness but no blacking out or LOC.  Denies diarrhea, constipation, urinary issues, recent travel history.  She also reports swelling of feet and nausea. Pt states her fingers feel numb and weak.     In ED, pt tachycardiac to 104 on admission. EKG showed NSR. She received 325mg aspirin and 1L NS bolus.    CT head showed globus pallidius calcification. Carotid dopplers with no evidence of stenosis. ECHO EF 62% no other acute findings. Patient was monitored on telemetry with no acute events. Stress test had no evidence of ischemia.     Per neurology - symptoms very unlikely to be stroke. No further inpatient workup for CT scan findings given lack of other neurologic symptoms - can see neurology and endocrinology outpt.      Pt stable to be discharged.

## 2020-11-06 NOTE — PROGRESS NOTE ADULT - ASSESSMENT
55 year old female from home, with PMH of HTN, stroke 6 years ago and CHF? presents to the ED with complaint of chest pain, shortness of breath and blurry vision.   1.Tele monitoring.  2.Stress test-R/O ischemia.  3.Neurology eval noted.  4.HTN-cont bp medication.  5.Lipid d/o-statin.  6.Prediabetes-diet.  7.Replace vit d.  8.GI and DVT prophylaxis.

## 2020-11-06 NOTE — DISCHARGE NOTE PROVIDER - NSDCCPCAREPLAN_GEN_ALL_CORE_FT
PRINCIPAL DISCHARGE DIAGNOSIS  Diagnosis: Chest pain, rule out acute myocardial infarction  Assessment and Plan of Treatment: You came in with chest pain. Blood test, electrocardiogram, chest xray showed no evidence of disease. Echocardiogram showed a normal ejection fraction of 62% and no other gross abnormalities. Stress test was also negative for heart disease. Your symptoms are likely musculoskeletal from overworking. Please follow up with your PCP.      SECONDARY DISCHARGE DIAGNOSES  Diagnosis: SOB (shortness of breath)  Assessment and Plan of Treatment: You were evaluated by pulmonologist. Chest xray had no abnormalities. Follow up with a pulmonologist for outpatient lung volume testing for lung diseases.    Diagnosis: Transaminitis  Assessment and Plan of Treatment: Your liver function tests were elevated. You did not have any abdominal pain. Please abstain from alcohol when possible and follow up with your PCP for monitoring.    Diagnosis: Blurry vision  Assessment and Plan of Treatment: You complained of blurry vision. You were evaluated by a neurologist who believes it is highly unlikely you had a stroke. Your CT scan showed calcification in the globus pallidius. This can sometimes be an incidental finding with no effects on your health. No need for further testing in the hospital at this time given lack of any other symptoms. Please follow up with an endocrinologist and neurologist for further testing.

## 2020-11-06 NOTE — DISCHARGE NOTE NURSING/CASE MANAGEMENT/SOCIAL WORK - PATIENT PORTAL LINK FT
You can access the FollowMyHealth Patient Portal offered by [facility] by registering at the following website: [email or web address]. By joining [facility]’s FollowMyHealth portal, you will also be able to view your health information using other applications (apps) compatible with our system.

## 2020-11-06 NOTE — PROGRESS NOTE ADULT - SUBJECTIVE AND OBJECTIVE BOX
CHIEF COMPLAINT:Patient is a 55y old  Female who presents with a chief complaint of chest pain.Pt appears comfortable.    	  REVIEW OF SYSTEMS:  CONSTITUTIONAL: No fever, weight loss, or fatigue  EYES: No eye pain, visual disturbances, or discharge  ENT:  No difficulty hearing, tinnitus, vertigo; No sinus or throat pain  NECK: No pain or stiffness  RESPIRATORY: No cough, wheezing, chills or hemoptysis; No Shortness of Breath  CARDIOVASCULAR: No chest pain, palpitations, passing out, dizziness, or leg swelling  GASTROINTESTINAL: No abdominal or epigastric pain. No nausea, vomiting, or hematemesis; No diarrhea or constipation. No melena or hematochezia.  GENITOURINARY: No dysuria, frequency, hematuria, or incontinence  NEUROLOGICAL: No headaches, memory loss, loss of strength, numbness, or tremors  SKIN: No itching, burning, rashes, or lesions   LYMPH Nodes: No enlarged glands  ENDOCRINE: No heat or cold intolerance; No hair loss  MUSCULOSKELETAL: No joint pain or swelling; No muscle, back, or extremity pain  PSYCHIATRIC: No depression, anxiety, mood swings, or difficulty sleeping  HEME/LYMPH: No easy bruising, or bleeding gums  ALLERGY AND IMMUNOLOGIC: No hives or eczema	        PHYSICAL EXAM:  T(C): 36.5 (11-06-20 @ 04:35), Max: 36.8 (11-05-20 @ 15:51)  HR: 73 (11-06-20 @ 04:35) (73 - 86)  BP: 109/68 (11-06-20 @ 04:35) (107/61 - 119/73)  RR: 18 (11-06-20 @ 04:35) (18 - 18)  SpO2: 99% (11-06-20 @ 04:35) (96% - 99%)      Appearance: Normal	  HEENT:   Normal oral mucosa, PERRL, EOMI	  Lymphatic: No lymphadenopathy  Cardiovascular: Normal S1 S2, No JVD, No murmurs, No edema  Respiratory: Lungs clear to auscultation	  Psychiatry: A & O x 3, Mood & affect appropriate  Gastrointestinal:  Soft, Non-tender, + BS	  Skin: No rashes, No ecchymoses, No cyanosis	  Neurologic: Non-focal  Extremities: Normal range of motion, No clubbing, cyanosis or edema  Vascular: Peripheral pulses palpable 2+ bilaterally    MEDICATIONS  (STANDING):  aspirin enteric coated 81 milliGRAM(s) Oral daily  atorvastatin 80 milliGRAM(s) Oral at bedtime  calcium carbonate 1250 mG  + Vitamin D (OsCal 500 + D) 1 Tablet(s) Oral daily  cefTRIAXone   IVPB      cefTRIAXone   IVPB 1000 milliGRAM(s) IV Intermittent every 24 hours  enoxaparin Injectable 40 milliGRAM(s) SubCutaneous daily  ergocalciferol 53260 Unit(s) Oral once  loratadine 10 milliGRAM(s) Oral daily  metoprolol tartrate 12.5 milliGRAM(s) Oral two times a day  multivitamin 1 Tablet(s) Oral daily      LABS:	 	      CARDIAC MARKERS ( 04 Nov 2020 23:34 )  <0.015 ng/mL / x     / x     / x     / x      CARDIAC MARKERS ( 04 Nov 2020 16:34 )  <0.015 ng/mL / x     / x     / x     / x                                    11.6   7.15  )-----------( 260      ( 06 Nov 2020 06:25 )             36.5     11-06    139  |  107  |  18  ----------------------------<  97  3.9   |  28  |  0.68    Ca    9.0      06 Nov 2020 06:25  Phos  4.2     11-05  Mg     2.5     11-05    TPro  6.7  /  Alb  2.9<L>  /  TBili  0.3  /  DBili  x   /  AST  43<H>  /  ALT  97<H>  /  AlkPhos  147<H>  11-06    proBNP: Serum Pro-Brain Natriuretic Peptide: 12 pg/mL (11-05 @ 07:08)  Serum Pro-Brain Natriuretic Peptide: 17 pg/mL (11-04 @ 16:34)    Lipid Profile: Cholesterol 175  LDL --  HDL 47      A1C with Estimated Average Glucose Result: 6.1    TSH: Thyroid Stimulating Hormone, Serum: 4.00 uU/mL (11-05 @ 07:08)      viaVitamin D, 25-Hydroxy: 20.9	      eh< from: Transthoracic Echocardiogram (11.05.20 @ 09:03) >  OBSERVATIONS:  Mitral Valve: Normal mitral valve. Trace mitral  regurgitation.  Aortic Root: Normal aortic root.  Aortic Valve: Aortic valve not well visualized. No aortic  stenosis. Trace aortic regurgitation.  Left Atrium: Normal left atrium.  LA volume index = 24  cc/m2.  Left Ventricle: Normal Left Ventricular Systolic Function,  (EF = 62% by biplane) Not all LV wall segments were seen.  Normal left ventricular internal dimensions and wall  thicknesses. Normal diastolic function.  Right Heart: Normal right atrium. Normal right ventricular  size and systolic function (TAPSE 2.6 cm). Normal tricuspid  valve. Trace tricuspid regurgitation. Pulmonic valve not  well seen. Trace pulmonic insufficiency is noted.  Pericardium/PleuraNo pericardialeffusion.  Hemodynamic: Insufficient tricuspid regurgitation jet to  allow calculation of RVSP.

## 2020-11-06 NOTE — DISCHARGE NOTE PROVIDER - NSDCMRMEDTOKEN_GEN_ALL_CORE_FT
Calcium 500+D oral tablet, chewable: 1 tab(s) orally 2 times a day  loratadine 10 mg oral tablet: 1 tab(s) orally once a day  losartan-hydrochlorothiazide 100mg-12.5mg oral tablet: 1 tab(s) orally once a day  lovastatin 10 mg oral tablet: 1 tab(s) orally once a day  Multiple Vitamins oral capsule: 1 cap(s) orally once a day   Calcium 500+D oral tablet, chewable: 1 tab(s) orally 2 times a day  ergocalciferol 2000 intl units (50 mcg) oral capsule: 1 tab(s) orally once a day   loratadine 10 mg oral tablet: 1 tab(s) orally once a day  losartan-hydrochlorothiazide 100mg-12.5mg oral tablet: 1 tab(s) orally once a day  lovastatin 10 mg oral tablet: 1 tab(s) orally once a day  Multiple Vitamins oral capsule: 1 cap(s) orally once a day

## 2020-11-06 NOTE — DISCHARGE NOTE PROVIDER - NSDCFUADDINST_GEN_ALL_CORE_FT
Please follow up with your PCP in 1-2 weeks. Also follow up with neurology, endocrinology, and pulmonology.

## 2020-11-06 NOTE — PROGRESS NOTE ADULT - SUBJECTIVE AND OBJECTIVE BOX
Pt is awake, alert, lying in bed in NAD. Went for stress test this AM.     INTERVAL HPI/OVERNIGHT EVENTS:      VITAL SIGNS:  T(F): 97.5 (20 @ 12:18)  HR: 73 (20 @ 04:35)  BP: 109/68 (20 @ 04:35)  RR: 18 (20 @ 12:18)  SpO2: 98% (20 @ 12:18)  Wt(kg): --  I&O's Detail          REVIEW OF SYSTEMS:    CONSTITUTIONAL:  No fevers, chills, sweats    HEENT:  Eyes:  No diplopia or blurred vision. ENT:  No earache, sore throat or runny nose.    CARDIOVASCULAR:  No pressure, squeezing, tightness, or heaviness about the chest; no palpitations.    RESPIRATORY:  Per HPI    GASTROINTESTINAL:  No abdominal pain, nausea, vomiting or diarrhea.    GENITOURINARY:  No dysuria, frequency or urgency.    NEUROLOGIC:  No paresthesias, fasciculations, seizures or weakness.    PSYCHIATRIC:  No disorder of thought or mood.      PHYSICAL EXAM:    Constitutional: Well developed and nourished  Eyes:Perrla  ENMT: normal  Neck:supple  Respiratory: good air entry  Cardiovascular: S1 S2 regular  Gastrointestinal: Soft, Non tender  Extremities: No edema  Vascular:normal  Neurological:Awake, alert,Ox3  Musculoskeletal:Normal      MEDICATIONS  (STANDING):  aspirin enteric coated 81 milliGRAM(s) Oral daily  atorvastatin 80 milliGRAM(s) Oral at bedtime  calcium carbonate 1250 mG  + Vitamin D (OsCal 500 + D) 1 Tablet(s) Oral daily  cefTRIAXone   IVPB      cefTRIAXone   IVPB 1000 milliGRAM(s) IV Intermittent every 24 hours  enoxaparin Injectable 40 milliGRAM(s) SubCutaneous daily  loratadine 10 milliGRAM(s) Oral daily  metoprolol tartrate 12.5 milliGRAM(s) Oral two times a day  multivitamin 1 Tablet(s) Oral daily    MEDICATIONS  (PRN):  acetaminophen   Tablet .. 650 milliGRAM(s) Oral every 6 hours PRN Moderate Pain (4 - 6)  ALBUTerol    90 MICROgram(s) HFA Inhaler 2 Puff(s) Inhalation every 6 hours PRN Shortness of Breath and/or Wheezing  ondansetron Injectable 4 milliGRAM(s) IV Push once PRN Nausea and/or Vomiting      Allergies    No Known Allergies    Intolerances        LABS:                        11.6   7.15  )-----------( 260      ( 2020 06:25 )             36.5     11-    139  |  107  |  18  ----------------------------<  97  3.9   |  28  |  0.68    Ca    9.0      2020 06:25  Phos  4.2       Mg     2.5         TPro  6.7  /  Alb  2.9<L>  /  TBili  0.3  /  DBili  x   /  AST  43<H>  /  ALT  97<H>  /  AlkPhos  147<H>      PT/INR - ( 2020 16:34 )   PT: 12.9 sec;   INR: 1.09 ratio         PTT - ( 2020 16:34 )  PTT:30.9 sec  Urinalysis Basic - ( 2020 19:17 )    Color: Yellow / Appearance: Clear / S.015 / pH: x  Gluc: x / Ketone: Negative  / Bili: Negative / Urobili: Negative   Blood: x / Protein: Negative / Nitrite: Negative   Leuk Esterase: Trace / RBC: 2-5 /HPF / WBC 6-10 /HPF   Sq Epi: x / Non Sq Epi: Moderate /HPF / Bacteria: Few /HPF        CARDIAC MARKERS ( 2020 23:34 )  <0.015 ng/mL / x     / x     / x     / x      CARDIAC MARKERS ( 2020 16:34 )  <0.015 ng/mL / x     / x     / x     / x          CAPILLARY BLOOD GLUCOSE        pro-bnp 12  @ 07:08     d-dimer --   @ 07:08  pro-bnp 17  @ 16:34     d-dimer --   @ 16:34      RADIOLOGY & ADDITIONAL TESTS:  < from: Nuclear Stress Test-Pharmacologic (20 @ 11:09) >  IMPRESSIONS:Normal Study  * Negative ECG evidence of ischemia after IV of Lexiscan.  * Review of raw data shows: The study is of good technical  quality.  * The left ventricle was normal in size. Normal myocardial  perfusion scan, with no evidence of infarction or  inducible ischemia.  * Post-stress resting myocardial perfusion gated SPECT  imaging was performed (LVEF = 70 %)    < end of copied text >    CXR:    Ct scan chest:    ekg;    echo:  < from: Transthoracic Echocardiogram (20 @ 09:03) >  CONCLUSIONS:  1. Normal mitral valve. Trace mitral regurgitation.  2. Aortic valve not well visualized. No aortic stenosis.  Trace aortic regurgitation.  3. Normal aortic root.  4. Normal left atrium.  5. Normal left ventricular internal dimensions and wall  thicknesses.  6. Normal Left Ventricular Systolic Function,  (EF = 62% by  biplane)  7. Normal diastolic function.  8. Normal right atrium.  9. Normal right ventricular size and systolic function  (TAPSE 2.6 cm).  10. Normal tricuspid valve. Trace tricuspid regurgitation.  11. Pulmonic valve not well seen. Trace pulmonic  insufficiency is noted.  12. No pericardial effusion.    < end of copied text >      < from: US Duplex Carotid Arteries Complete, Bilateral (20 @ 11:46) >  IMPRESSION: No significant hemodynamic stenosis of either carotid artery.    Measurement of carotid stenosis is based on velocity parameters that correlate the residual internal carotid diameter with that of the more distal vessel in accordance with a method such as the [employer] Symptomatic Carotid Endarterectomy Trial (NASCET).    < end of copied text >

## 2020-11-06 NOTE — DISCHARGE NOTE PROVIDER - CARE PROVIDER_API CALL
Dilshad Umanzor)  Internal Medicine; Pulmonary Disease  [address] [place] [address]  Phone: [phone]  Fax: [phone]  Follow Up Time: 1 month

## 2020-11-07 LAB — ANA TITR SER: NEGATIVE — SIGNIFICANT CHANGE UP

## 2020-11-09 LAB
% ALBUMIN: 56.2 % — SIGNIFICANT CHANGE UP
% ALPHA 1: 4.1 % — SIGNIFICANT CHANGE UP
% ALPHA 2: 9.3 % — SIGNIFICANT CHANGE UP
% BETA: 12.2 % — SIGNIFICANT CHANGE UP
% GAMMA: 18.2 % — SIGNIFICANT CHANGE UP
% M SPIKE: 2.5 % — SIGNIFICANT CHANGE UP
ALBUMIN SERPL ELPH-MCNC: 3.4 G/DL — LOW (ref 3.6–5.5)
ALBUMIN/GLOB SERPL ELPH: 1.3 RATIO — SIGNIFICANT CHANGE UP
ALPHA1 GLOB SERPL ELPH-MCNC: 0.3 G/DL — SIGNIFICANT CHANGE UP (ref 0.1–0.4)
ALPHA2 GLOB SERPL ELPH-MCNC: 0.6 G/DL — SIGNIFICANT CHANGE UP (ref 0.5–1)
B-GLOBULIN SERPL ELPH-MCNC: 0.7 G/DL — SIGNIFICANT CHANGE UP (ref 0.5–1)
CCP IGG SERPL-ACNC: <8 UNITS — SIGNIFICANT CHANGE UP
DSDNA AB SER-ACNC: <12 IU/ML — SIGNIFICANT CHANGE UP
GAMMA GLOBULIN: 1.1 G/DL — SIGNIFICANT CHANGE UP (ref 0.6–1.6)
HOMOCYSTEINE LEVEL: 8.8 UMOL/L — SIGNIFICANT CHANGE UP
INTERPRETATION SERPL IFE-IMP: SIGNIFICANT CHANGE UP
M-SPIKE: 0.2 G/DL — HIGH (ref 0–0)
METHYLMALONIC ACID LEVEL: 402 NMOL/L — HIGH (ref 87–318)
PROT PATTERN SERPL ELPH-IMP: SIGNIFICANT CHANGE UP
RF+CCP IGG SER-IMP: NEGATIVE — SIGNIFICANT CHANGE UP

## 2021-01-06 NOTE — ED ADULT TRIAGE NOTE - HEIGHT IN CM
1/6/2021       RE: Jacinto Flannery  [address]  [place] [address]     Dear Colleague,    Thank you for referring your patient, Jacinto Flannery, to the [facility] [place] at [facility]. Please see a copy of my visit note below.    [facility] Dermatology Note      Dermatology Problem List:  1.  NMSC              -SCC, left (dorsal) forearm, s/p excision 8/18/2015              -SCC, right dorsal hand, s/p Mohs 3/18/2015  2. Compound nevus with moderate dysplasia, mid upper back              -s/p biopsy 10/16/2015  3. Actinic keratosis                -s/p cryotherapy 07/20/2016, 03/06/19              -efudex in November 2016 (used for 19 days), pt had a very robust response              -pt given hydrocortisone 2.5% cream BID PRN due to severe response and told to stop efudex  4. Unknown skin eruption penis. Resolved 7/5/17              -s/p triamcinolone cream initiated 3/17/2016              -s/p Nystatin initiated 3/17/2016              -s/p Mupirocin initiated 2/16/2015              -s/p Valtrex initiated 2/6/2015              -s/p biopsy showing hemorraghic scale crust and ulceration 2/3/2015  5. Drug exanthem, 2/2 to tamsulosin              -s/p biopsy showing interface dermatitis 12/29/2014              -s/p lidex initiated 1/8/2015              -s/p clobetasol initiated 12/29/2014-improved  6. Traumatic tattoo              -Central forehead, measured 1X1.5cm on 10/16/17  7. Rosacea              -Metronidazole cream    Encounter Date: Jan 6, 2021    CC:   Chief Complaint   Patient presents with     Skin Check     Sammy is here today for a skin check. He is concerned about a spot above the left eye brow         History of Present Illness:  Mr. Jacinto Flannery is a 80 year old male who presents for 6 month skin check, concern for new dry spot above his left eyebrow. The patient was last seen 11/03/2020 when he had a virtual visit for  follow up on cryotherapy for SK on right neck.   New dry skin lesion above left eyebrow, does not hurt, does not bleed.     Past Medical History:   Patient Active Problem List   Diagnosis     CAD S/P percutaneous coronary angioplasty     CAD (coronary artery disease)     BPH (benign prostatic hyperplasia)     Hyperlipidemia with target LDL less than 70     S/P coronary artery stent placement     GERD (gastroesophageal reflux disease)     S/P TURP     Dermatitis     AK (actinic keratosis)     SCC (squamous cell carcinoma)     SK (seborrheic keratosis)     EIC (epidermal inclusion cyst)     History of squamous cell carcinoma     Seborrheic keratosis     Inflamed seborrheic keratosis     History of dysplastic nevus     Varicose veins     Medication reaction, initial encounter     HSV (herpes simplex virus) infection     Hypertension     Insomnia     Thrombocytopenia (H)     Benign prostatic hyperplasia with urinary obstruction     Hypertrophy of prostate without urinary obstruction and other lower urinary tract symptoms (LUTS)     Atherosclerosis of coronary artery     Recurrent coronary arteriosclerosis after percutaneous transluminal coronary angioplasty     History of atypical nevus     Hyperlipidemia     History of transurethral resection of prostate     Presence of coronary angioplasty implant and graft     Squamous cell carcinoma of skin     Varicose veins of other specified sites     Past Medical History:   Diagnosis Date     BPH (benign prostatic hyperplasia)      CAD (coronary artery disease)     MI 1989, CABG 1990, 11/2014 PCI to LM, LCx and RCA     Conductive hearing loss Unknown.     GERD (gastroesophageal reflux disease)      Hearing problem Unknown     HSV-2 (herpes simplex virus 2) infection 09/2014     Hyperlipidaemia LDL goal < 70      Hypertension      Insomnia      NSTEMI (non-ST elevated myocardial infarction) (H) 11/15/2014    Type 4a     Reduced vision Mid-adult years    Mother, father     S/P  coronary artery stent placement 11/12/14    x 4 ANKUR's     Sensorineural hearing loss Unknown.     Skin disease      Squamous cell carcinoma      Past Surgical History:   Procedure Laterality Date     C CABG, VEIN, THREE  1990    SVG-LAD in [place]     CHOLECYSTECTOMY  1992    in [place]     ENDOSCOPY  04/26/2013    Done at [facility] in [place], [place]     GENITOURINARY SURGERY       HEART CATH STENT COR W/WO PTCA  11/12/2014    x 4 ANKUR's (two ostial to mid-RCA, one ostial LCx and mid-LM, one ostial LAD)     HERNIA REPAIR Right 2010    in [place]     LASER KTP GREEN LIGHT PHOTOSELECTIVE VAPORIZATION PROSTATE N/A 1/5/2015    Procedure: LASER KTP GREEN LIGHT PHOTOSELECTIVE VAPORIZATION PROSTATE;  Surgeon: Natalie Porter MD;  Location: [facility] OR     MOHS MICROGRAPHIC PROCEDURE         Social History:  Patient reports that he quit smoking about 50 years ago. His smoking use included cigarettes. He started smoking about 55 years ago. He has a 0.50 pack-year smoking history. He has never used smokeless tobacco. He reports current alcohol use. He reports that he does not use drugs.    Family History:  Family History   Problem Relation Age of Onset     Cancer Father         Stomach     Heart Disease Mother      Heart Disease Maternal Grandmother      Skin Cancer No family hx of      Melanoma No family hx of        Medications:  Current Outpatient Medications   Medication Sig Dispense Refill     alfuzosin ER (UROXATRAL) 10 MG 24 hr tablet Take 1 tablet (10 mg) by mouth daily 90 tablet 3     aspirin 81 MG tablet Take 81 mg by mouth daily       carBAMazepine (EPITOL) 200 MG tablet Take 1 tablet (200 mg) by mouth 2 times daily 180 tablet 3     Cyanocobalamin (B-12) 1000 MCG CAPS Take 1 capsule by mouth daily        eszopiclone (LUNESTA) 2 MG tablet Take 1 tablet (2 mg) by mouth nightly as needed for sleep 15 tablet 5     melatonin 1 MG CAPS        metoprolol succinate ER (TOPROL-XL) 25 MG 24 hr tablet TAKE 1/2  (ONE-HALF) TABLET BY MOUTH ONCE DAILY 45 tablet 3     metroNIDAZOLE (METROCREAM) 0.75 % external cream Apply topically 2 times daily 45 g 11     nitroGLYcerin (NITROSTAT) 0.4 MG sublingual tablet Place 1 tablet (0.4 mg) under the tongue every 5 minutes as needed for chest pain 25 tablet 3     simvastatin (ZOCOR) 40 MG tablet Take 1 tablet (40 mg) by mouth At Bedtime 90 tablet 3     valACYclovir (VALTREX) 1000 mg tablet Take 1 pill twice a day for 3 days at the first sign of a cold core. 6 tablet 3        Allergies   Allergen Reactions     Macrodantin [Nitrofurantoin] Rash     Sulfa Drugs Rash     Tamsulosin Rash         Review of Systems:  -Constitutional: Otherwise feeling well today, in usual state of health.  -Skin: As above in HPI. No additional skin concerns.    Physical exam:  Vitals: There were no vitals taken for this visit.  GEN: This is a well developed, well-nourished male in no acute distress, in a pleasant mood.    SKIN: Full skin, which includes the head/face, both arms, chest, back, abdomen,both legs, buttocks, digits and/or nails, was examined.  -Villatoro skin type: I  -There is an erythematous macule with overyling adherent scale above the left eyebrow medially.  -Few scattered benign nevi  -Waxy stuck on papules  -No other lesions of concern on areas examined.     Impression/Plan:  1. Actinic keratosis  - Cryotherapy procedure note: After verbal consent and discussion of risks and benefits including but no limited to dyspigmentation/scar, blister, and pain, 1 lesion above left medial eyebrow was(were) treated with 1-2mm freeze border for 2 cycles with liquid nitrogen. Post cryotherapy instructions were provided.  - 6 month follow up     2.  Nevi- no atypia    3. Seborrheic keratoses- no atypia      CC Referred Self, MD  No address on file on close of this encounter.  Follow-up in 6 months, earlier for new or changing lesions.       Dr. Quarles staffed the patient.    Staff  Involved:  Resident(Jony Rouse MD PGY3 family medicine resident)/Staff  I was present for the entire procedure. Anjelica Quarles MD  I, Anjelica Quarles MD, saw this patient with the resident and agree with the resident s findings and plan of care as documented in the resident s note.       170.18

## 2022-02-24 NOTE — H&P ADULT - NSICDXFAMILYHX_GEN_ALL_CORE_FT
Refill requested for:   metoPROLOL succinate (TOPROL-XL) 25 MG 24 hr tablet 90 tablet 1 8/24/2021     Sig - Route: TAKE 1 TABLET BY MOUTH DAILY - Oral      Last office visit: 01/19/2021    Medication refilled per protocol.   No pertinent family history in first degree relatives

## 2022-04-07 ENCOUNTER — EMERGENCY (EMERGENCY)
Facility: HOSPITAL | Age: 57
LOS: 1 days | Discharge: HOME | End: 2022-04-07
Attending: EMERGENCY MEDICINE
Payer: MEDICAID

## 2022-04-07 VITALS
RESPIRATION RATE: 16 BRPM | SYSTOLIC BLOOD PRESSURE: 132 MMHG | WEIGHT: 231.93 LBS | HEART RATE: 93 BPM | HEIGHT: 67 IN | TEMPERATURE: 98 F | OXYGEN SATURATION: 95 % | DIASTOLIC BLOOD PRESSURE: 92 MMHG

## 2022-04-07 DIAGNOSIS — Z98.891 HISTORY OF UTERINE SCAR FROM PREVIOUS SURGERY: Chronic | ICD-10-CM

## 2022-04-07 DIAGNOSIS — Z98.82 BREAST IMPLANT STATUS: Chronic | ICD-10-CM

## 2022-04-07 PROCEDURE — 99283 EMERGENCY DEPT VISIT LOW MDM: CPT

## 2022-04-07 RX ORDER — DIPHENHYDRAMINE HCL 50 MG
1 CAPSULE ORAL
Qty: 30 | Refills: 0
Start: 2022-04-07

## 2022-04-07 RX ORDER — DIPHENHYDRAMINE HCL 50 MG
25 CAPSULE ORAL ONCE
Refills: 0 | Status: COMPLETED | OUTPATIENT
Start: 2022-04-07 | End: 2022-04-07

## 2022-04-07 RX ADMIN — Medication 25 MILLIGRAM(S): at 20:38

## 2022-04-07 RX ADMIN — Medication 40 MILLIGRAM(S): at 20:38

## 2022-04-07 NOTE — ED ADULT NURSE NOTE - OBJECTIVE STATEMENT
Offered  ,refused states she understand english . States she has itchy rash to abdomen ,thigh and feet since friday .Airway patent . No SOB .

## 2022-04-07 NOTE — ED PROVIDER NOTE - SKIN RASH DESCRIPTION
rash on abdomen, thighs and dorsal aspect of feet. not on soles, no oral lesions, no facial involvement/PAPULAR/MACULAR

## 2022-04-07 NOTE — ED PROVIDER NOTE - CLINICAL SUMMARY MEDICAL DECISION MAKING FREE TEXT BOX
Patient with macular papular rash, pruritis, getting worse. not petechial, not erythematous, no desquamation or blistering of skin. well appearing, no other lesions. home with prednisone and dermatology followup.

## 2022-04-07 NOTE — ED PROVIDER NOTE - PATIENT PORTAL LINK FT
You can access the FollowMyHealth Patient Portal offered by [facility] by registering at the following website: [email or web address]. By joining [employer]’s FollowMyHealth portal, you will also be able to view your health information using other applications (apps) compatible with our system.

## 2022-04-07 NOTE — ED PROVIDER NOTE - NSFOLLOWUPINSTRUCTIONS_ED_ALL_ED_FT
Erupción cutánea en los adultos    Rash, Adult       Shane erupción es un cambio en el color de la piel. Shane erupción también puede cambiar la forma en que se siente la piel. Hay muchas afecciones y factores diferentes que pueden causar shane erupción. Algunas erupciones pueden desaparecer después de algunos días, jacky otras pueden durar algunas semanas. Entre las causas frecuentes de erupciones se incluyen las siguientes:•Infecciones virales shiela:  •Resfríos.      •Sarampión.      •Enfermedad mano-pie-boca.      •Infecciones bacterianas, shiela:  •Escarlatina.      •Impétigo.        •Infecciones por hongos, shiela Candida.      •Reacciones alérgicas a los alimentos, medicamentos o productos para el cuidado de la piel.        Siga estas indicaciones en campbell casa:    El objetivo del tratamiento es calmar la picazón y evitar que la erupción se propague. Esté atento a cualquier cambio en los síntomas. Estas indicaciones pueden ayudarlo con la afección:      Medicamentos      [place] o aplíquese los medicamentos de venta stephanie y los recetados solamente shiela se lo haya indicado el médico. Estos medicamentos pueden incluir:  •Cremas con corticoesteroides para tratar la piel enrojecida o hinchada.      •Lociones para aliviar la picazón.      •Antialérgicos por vía oral (antihistamínicos).      •Corticoesteroides por vía oral para los síntomas graves.      Cuidado de la piel     •Aplique compresas frías en las zonas afectadas.      • No se rasque ni se refriegue la piel.      •Evite cubrir la erupción. Asegúrese de que la erupción esté expuesta al aire todo lo posible.      Control de la picazón y las molestias     •Evite los vasyl de inmersión y las duchas calientes ya que pueden empeorar la picazón. Shane ducha fría puede aliviar el malestar.    •Trate de aquilino un baño con lo siguiente:  •Sales de Epsom. Siga las indicaciones del fabricante que se encuentran en el envase. Puede conseguirlas en la aramis de comestibles o la farmacia local.      •Bicarbonato de sodio. Vierta un poco en la bañera shiela se lo haya indicado el médico.      •Myesha coloidal. Siga las indicaciones del fabricante que se encuentran en el envase. Puede conseguirla en la aramis de comestibles o la farmacia local.        •Intente colocarse shane pasta de bicarbonato de sodio sobre la piel. Agregue agua al bicarbonato hasta que tenga la consistencia de shane pasta.      •Pruebe aplicarse loción de calamina. Se trata de shane loción de venta stephanie que ayuda a aliviar la picazón.      •Manténgase fresco y al resguardo donavan. La transpiración y el calor pueden empeorar la picazón.        Indicaciones generales      •Descanse todo lo que sea necesario.      •Stacy suficiente líquido shiela para mantener la orina de color amarillo pálido.      •Use ropa holgada.      •Evite los detergentes y los jabones perfumados, y los perfumes. Utilice jabones, detergentes, perfumes y cosméticos suaves.    •Evite las sustancias que causan la erupción. Lleve un diario shiela ayuda para registrar lo que le causa erupción. Escriba los siguientes datos:  •Lo que come.      •Los cosméticos que utiliza.      •Lo que missy.      •La ropa que usa. Nicholson incluye las alhajas.        •Concurra a todas las visitas de seguimiento shiela se lo haya indicado el médico. Nicholson es importante.        Comuníquese con un médico si:    •Transpira de noche.      •Pierde peso.      •Orina más de lo normal.      •Orina menos de lo normal u observa que la orina es de color más oscuro que lo habitual.      •Se siente débil.      •Vomita.      •Tiene un color amarillo en la piel o en las partes diego del ming (ictericia).    •La piel:  •Hormiguea.      •Se adormece.      •La erupción cutánea:  •No desaparece después de varios días.      •Empeora.      •Usted:  •Está inusualmente sediento.      •Está más cansado que lo habitual.      •Tiene los siguientes síntomas:  •Síntomas nuevos.      •Dolor en el abdomen.      •Fiebre.      •Diarrea.          Solicite ayuda inmediatamente si:    •Tiene fiebre y los síntomas empeoran repentinamente.      •Presenta confusión.      •Tiene un dolor de narayan intenso o rigidez en el farzana.      •Tiene dolor intenso o rigidez en las articulaciones.      •Tiene shane convulsión.      •Presenta shane erupción que cubre todo o reina todo el cuerpo. La erupción puede o no ser dolorosa.    •Le aparecen ampollas que:  •Se encuentran arriba de la erupción.      •Se agrandan o crecen juntas.      •Son dolorosas.      •Están dentro de la nariz o la boca.      •Presenta shane erupción que:  •Tiene pequeñas manchas moradas, shiela si fueran pinchazos, en todo el cuerpo.      •Tiene un “ming de buey” o se parece a un lawson de tiro.      •No está relacionada con shane exposición al sol, está enrojecida y duele, y produce descamación de la piel.          Resumen    •Shane erupción es un cambio en el color de la piel. Algunas erupciones desaparecen después de algunos días, jacky otras pueden durar algunas semanas.      •El objetivo del tratamiento es calmar la picazón y evitar que la erupción se propague.      •[place] o aplíquese los medicamentos de venta stephanie y los recetados solamente shiela se lo haya indicado el médico.      •Comuníquese con un médico si tiene síntomas nuevos o los síntomas empeoran.      •Concurra a todas las visitas de seguimiento shiela se lo haya indicado el médico. Nicholson es importante.      Esta información no tiene shiela fin reemplazar el consejo del médico. Asegúrese de hacerle al médico cualquier pregunta que tenga.

## 2022-04-07 NOTE — ED PROVIDER NOTE - OBJECTIVE STATEMENT
Patient with itchy rash that developed over past several days. Patient thinks its due to eating pork. notes initially rash was on legs but is now also on the abdomen.

## 2022-04-08 PROBLEM — M79.89 OTHER SPECIFIED SOFT TISSUE DISORDERS: Chronic | Status: ACTIVE | Noted: 2020-11-05

## 2022-04-08 PROBLEM — I63.9 CEREBRAL INFARCTION, UNSPECIFIED: Chronic | Status: ACTIVE | Noted: 2020-11-05

## 2022-11-25 ENCOUNTER — EMERGENCY (EMERGENCY)
Facility: HOSPITAL | Age: 57
LOS: 1 days | Discharge: HOME | End: 2022-11-25
Attending: STUDENT IN AN ORGANIZED HEALTH CARE EDUCATION/TRAINING PROGRAM
Payer: MEDICAID

## 2022-11-25 VITALS
TEMPERATURE: 98 F | OXYGEN SATURATION: 98 % | RESPIRATION RATE: 18 BRPM | DIASTOLIC BLOOD PRESSURE: 79 MMHG | SYSTOLIC BLOOD PRESSURE: 119 MMHG | WEIGHT: 179.9 LBS | HEART RATE: 79 BPM

## 2022-11-25 DIAGNOSIS — Z98.82 BREAST IMPLANT STATUS: Chronic | ICD-10-CM

## 2022-11-25 DIAGNOSIS — Z98.891 HISTORY OF UTERINE SCAR FROM PREVIOUS SURGERY: Chronic | ICD-10-CM

## 2022-11-25 PROCEDURE — 99283 EMERGENCY DEPT VISIT LOW MDM: CPT

## 2022-11-25 PROCEDURE — 99283 EMERGENCY DEPT VISIT LOW MDM: CPT | Mod: 25

## 2022-11-25 PROCEDURE — 69200 CLEAR OUTER EAR CANAL: CPT

## 2022-11-25 RX ORDER — POLYMYXIN B SULF/TRIMETHOPRIM 10000-1/ML
4 DROPS OPHTHALMIC (EYE) ONCE
Refills: 0 | Status: COMPLETED | OUTPATIENT
Start: 2022-11-25 | End: 2022-11-25

## 2022-11-25 RX ADMIN — Medication 4 DROP(S): at 20:09

## 2022-11-25 NOTE — ED PROVIDER NOTE - NS ED ATTENDING STATEMENT MOD
This was a shared visit with the KARTIK. I reviewed and verified the documentation and independently performed the documented:

## 2022-11-25 NOTE — ED PROVIDER NOTE - PROGRESS NOTE DETAILS
Cerumen removed from both ears. Slight trauma to right ear, will give polytrim. Patient reports an improvement in her hearing in the right ear but not left. Will have her follow up with ENT. Pt is well appearing walking with steady gait, stable for discharge and follow up without fail with medical doctor. I had a detailed discussion with the patient and/or guardian regarding the historical points, exam findings, and any diagnostic results supporting the discharge diagnosis. Pt educated on care and need for follow up. Strict return instructions and red flag signs and symptoms discussed with patient. Questions answered. Pt shows understanding of discharge information and agrees to follow.

## 2022-11-25 NOTE — ED PROVIDER NOTE - PATIENT PORTAL LINK FT
You can access the FollowMyHealth Patient Portal offered by [facility] by registering at the following website: [email or web address]. By joining [employer]’s FollowMyHealth portal, you will also be able to view your health information using other applications (apps) compatible with our system.

## 2022-11-25 NOTE — ED PROVIDER NOTE - NSFOLLOWUPCLINICS_GEN_ALL_ED_FT
[facility]  Otolaryngology (ENT)  [address] [place] [address] - [address] [place] [address]  Phone: [phone]  Fax:     [facility]  ENT  [address] [place] [address]  Phone: [phone]  Fax: [phone]

## 2022-11-25 NOTE — ED PROVIDER NOTE - NS ED MD DISPO DISCHARGE
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
Home

## 2022-11-25 NOTE — ED PROVIDER NOTE - NSFOLLOWUPINSTRUCTIONS_ED_ALL_ED_FT
Seguimiento con el otorrinolaringólogo dentro de 1 semana.    Coloque las gotas para los oídos en kt oídos 4 gotas 4 veces al día mike 7 días.    Puede aquilino motrin/tylenol según sea necesario para el dolor.    Si experimenta síntomas nuevos o que empeoran, o si está preocupado, siempre puede regresar a la emergencia para shane reevaluación.    Follow up with the ENT within 1 week.     Put the ear drops in your ears 4 drops 4 times a day for 7 days.     You can take motrin/tylenol as needed for pain.    If you experience any new or worsening symptoms or if you are concerned you can always come back to the emergency for a re-evaluation. social smoker

## 2022-11-25 NOTE — ED PROVIDER NOTE - OBJECTIVE STATEMENT
57 year old female with b/l hearing loss. Left for >2 weeks and right for half a week. Denies fevers, discharge, foreign body.

## 2022-11-25 NOTE — ED PROVIDER NOTE - ENMT, MLM
Airway patent, Nasal mucosa clear. Mouth with normal mucosa. Throat has no vesicles, no oropharyngeal exudates and uvula is midline. b/l cerumen impaction

## 2023-01-31 ENCOUNTER — EMERGENCY (EMERGENCY)
Facility: HOSPITAL | Age: 58
LOS: 1 days | Discharge: HOME | End: 2023-01-31
Attending: EMERGENCY MEDICINE
Payer: MEDICAID

## 2023-01-31 VITALS
DIASTOLIC BLOOD PRESSURE: 65 MMHG | RESPIRATION RATE: 18 BRPM | HEART RATE: 81 BPM | SYSTOLIC BLOOD PRESSURE: 105 MMHG | OXYGEN SATURATION: 99 % | TEMPERATURE: 98 F

## 2023-01-31 VITALS
HEART RATE: 99 BPM | WEIGHT: 169.98 LBS | DIASTOLIC BLOOD PRESSURE: 70 MMHG | OXYGEN SATURATION: 99 % | HEIGHT: 67 IN | SYSTOLIC BLOOD PRESSURE: 101 MMHG | RESPIRATION RATE: 18 BRPM | TEMPERATURE: 99 F

## 2023-01-31 DIAGNOSIS — Z98.82 BREAST IMPLANT STATUS: Chronic | ICD-10-CM

## 2023-01-31 DIAGNOSIS — Z98.891 HISTORY OF UTERINE SCAR FROM PREVIOUS SURGERY: Chronic | ICD-10-CM

## 2023-01-31 PROCEDURE — 99284 EMERGENCY DEPT VISIT MOD MDM: CPT | Mod: 25

## 2023-01-31 PROCEDURE — 99284 EMERGENCY DEPT VISIT MOD MDM: CPT

## 2023-01-31 PROCEDURE — 96375 TX/PRO/DX INJ NEW DRUG ADDON: CPT

## 2023-01-31 PROCEDURE — 96374 THER/PROPH/DIAG INJ IV PUSH: CPT

## 2023-01-31 RX ORDER — AMPICILLIN SODIUM AND SULBACTAM SODIUM 250; 125 MG/ML; MG/ML
3 INJECTION, POWDER, FOR SUSPENSION INTRAMUSCULAR; INTRAVENOUS ONCE
Refills: 0 | Status: COMPLETED | OUTPATIENT
Start: 2023-01-31 | End: 2023-01-31

## 2023-01-31 RX ORDER — KETOROLAC TROMETHAMINE 30 MG/ML
15 SYRINGE (ML) INJECTION ONCE
Refills: 0 | Status: DISCONTINUED | OUTPATIENT
Start: 2023-01-31 | End: 2023-01-31

## 2023-01-31 RX ORDER — SODIUM CHLORIDE 9 MG/ML
1000 INJECTION INTRAMUSCULAR; INTRAVENOUS; SUBCUTANEOUS ONCE
Refills: 0 | Status: COMPLETED | OUTPATIENT
Start: 2023-01-31 | End: 2023-01-31

## 2023-01-31 RX ORDER — DEXAMETHASONE 0.5 MG/5ML
10 ELIXIR ORAL ONCE
Refills: 0 | Status: COMPLETED | OUTPATIENT
Start: 2023-01-31 | End: 2023-01-31

## 2023-01-31 RX ADMIN — SODIUM CHLORIDE 1000 MILLILITER(S): 9 INJECTION INTRAMUSCULAR; INTRAVENOUS; SUBCUTANEOUS at 12:42

## 2023-01-31 RX ADMIN — Medication 15 MILLIGRAM(S): at 12:42

## 2023-01-31 RX ADMIN — AMPICILLIN SODIUM AND SULBACTAM SODIUM 200 GRAM(S): 250; 125 INJECTION, POWDER, FOR SUSPENSION INTRAMUSCULAR; INTRAVENOUS at 12:42

## 2023-01-31 RX ADMIN — Medication 10 MILLIGRAM(S): at 13:07

## 2023-01-31 NOTE — ED PROVIDER NOTE - CLINICAL SUMMARY MEDICAL DECISION MAKING FREE TEXT BOX
57-year-old female, presents with chief complaint sore throat x6 days.  Patient appears uncomfortable but nontoxic, mildly hypertensive, no tachycardia and afebrile.  Exam consistent with mild dehydration and acute bilateral tonsillitis.  At this time there is a low clinical suspicion for deep neck abscess.  No signs of meningeal irritation.  Lungs CTA.  Plan to give Decadron to improve inflammation/pain, start Unasyn IV and IV fluids.  Patient most likely will be able to go home with Augmentin Rx and PMD follow-up in 2 to 3 days.

## 2023-01-31 NOTE — ED PROVIDER NOTE - PATIENT PORTAL LINK FT
You can access the FollowMyHealth Patient Portal offered by [facility] by registering at the following website: [email or web address]. By joining [employer]’s FollowMyHealth portal, you will also be able to view your health information using other applications (apps) compatible with our system.

## 2023-01-31 NOTE — ED PROVIDER NOTE - OBJECTIVE STATEMENT
57-year-old female, history of bariatric surgery, hypertension, presents for evaluation of sore throat x6 days.  Reports bilateral throat pain that is progressively getting worse.  Associated with generalized headache and generalized body aches.  Also reports painful swallowing but no sensation of obstruction.  Yesterday had 2 episodes of vomiting NBNB.  Also reports decreased p.o. intake secondary to pain.  Denies any voice changes, neck stiffness, facial/throat swelling, shortness of breath, chest pain, fever or any other complaints.

## 2023-01-31 NOTE — ED PROVIDER NOTE - PROGRESS NOTE DETAILS
Feeling better. Tolerates PO intake. Also requesting medication for cough which she has been having intermittently. Will dc with PMD follow up in 2-3 days. Pt is well appearing walking with steady gait, stable for discharge and follow up without fail with medical doctor. I had a detailed discussion with the patient and/or guardian regarding the historical points, exam findings, and any diagnostic results supporting the discharge diagnosis. Pt educated on care and need for follow up. Strict return instructions and red flag signs and symptoms discussed with patient. Questions answered. Pt shows understanding of discharge information and agrees to follow.

## 2023-01-31 NOTE — ED PROVIDER NOTE - ATTENDING APP SHARED VISIT CONTRIBUTION OF CARE
seen with acp  c/o sore throat malaise  positive tonsillitis positive lymphadenopathy  chest clear  given dex, toradol and unasyn   patient improved  will discharges  agree with acps assessment hx and physical and disposition

## 2023-01-31 NOTE — ED PROVIDER NOTE - NSFOLLOWUPINSTRUCTIONS_ED_ALL_ED_FT
Seguimiento con el médico de atención primaria en 2-3 días.  Tylenol 650 mg por vía oral cada 6 horas según sea necesario para el dolor/fiebre (anuj 4000 mg en 24 horas).  Si experimenta síntomas nuevos o que empeoran, o si está preocupado, siempre puede regresar a la emergencia para shane reevaluación.  Si le dieron alguna receta mike la visita de nini peterson según lo prescrito. Si tiene alguna valorie puede preguntar al farmacéutico.    * * *    Follow up with the primary care doctor in 2-3 days.  Tylenol 650 mg orally every 6 hours as needed for pain/fever (max 4000 mg in 24 hours).   If you experience any new or worsening symptoms or if you are concerned you can always come back to the emergency for a re-evaluation.  If there were any prescriptions given to you during the visit today take them as prescribed. If you have any questions you can ask the pharmacist.

## 2024-01-01 NOTE — ED ADULT NURSE NOTE - NS ED NURSE REPORT GIVEN DT
Diet, Breastfeeding:     Breastfeeding Frequency: ad micaela     Special Instructions for Nursing:  on demand, unless medically contraindicated (06-10-24 @ 19:11) [Active]       04-Nov-2020 19:30

## 2024-01-20 NOTE — ED ADULT NURSE NOTE - ISOLATION TYPE:
Initial SW/CM Assessment/Plan of Care Note     Baseline Assessment  72 year old admitted 1/15/2024 as Inpatient with a diagnosis of .   Prior to admission patient was living with Alone and residing at Apartment    .  Patient does not  have a Power of  for Healthcare. Patient’s Primary Care Provider is Kirk Kate PA-C.     Progress Note  Saturday coverage. Referral received this date per APNP for Advance Directives stating \"Can you please locate a PO-HC or help pt to complete one? Thanks!\" No POAHC located per Epic review.      Called pt in the room and he sounded alert, pleasant and appropriate and introduced self and role and reason for call. Pt said he had not requested a POAHC and has already completed one with the assistance of his [facility] . Pt did not recall the name of his  but said he had appointed his sister as his health care agent. Pt said his PCP's office also has a copy ([phone]). Pt said he will have a ride home from a neighbor in his building and has key and warm clothing here.      Called [facility] ([phone]) and they were unable to access the information on assigned  or RN until Monday (1/22). Yesterday's  note reflects pt plans to return home with support from wife Edna and home care choice was offered and pt opted for [facility].      Will follow up on Monday to locate POAHC document for scanning.     Plan  Patient/Family Discharge Goal:          SW/CM - Recommendations for Discharge:     PT - Recommendations for Discharge:      Last Filed Values         Value Time User    PT Discharge Needs  therapy 5 or more times per week 1/17/2024  3:10 PM Franke, Michelle E, PT          OT - Recommendations for Discharge:      Last Filed Values         Value Time User    OT Discharge Needs  therapy 5 or more times per week 1/18/2024 12:23 PM Ana Carson OT          SLP - Recommendations for Discharge:    Last  Filed Values       None            Barriers to Discharge  Identified Barriers to Discharge/Transition Planning:          Anticipate patient will need post-hospital services. Necessary services are available.  Anticipate patient can return to the environment from which patient entered the hospital.   Anticipate patient cannot provide self-care at discharge.    Refer to FLORENCE/MARYLU Flowsheet for objective data.     Medical History  History reviewed. No pertinent past medical history.    Prior to Admission Status       Agency/Support  Type of Services Prior to Hospitalization: None               Support Systems: Significant other   Home Devices/Equipment: Shower chair, Blood glucose monitor         Mobility Assist Devices: Cane  Sensory Support Devices: Dentures      Current Status  PT Ambulation Tips: Use walker, Use gait belt, Needs minimal assist, Walk to bathroom  PT Transfer Tips: Use walker, Use gait belt, Needs minimal assist, Up in chair for meals   OT Bathing Tips: Bathes with minimal assist  OT Dressing Tips: Dresses with minimal assist  OT Toileting Tips: Able toilet in bathroom, Toilets with minimal assist  OT Feeding Tips:    SLP Swallow/Feeding Tips:    SLP Comm/Cog Tips:    Current Mental Status:    Stressors:      Insurance  Primary: [facility] MEDICARE ADVANTAGE  Secondary: [facility]    Disposition Recommendations:  FLORENCE/MARYLU recommendation for discharge:              None

## 2024-07-29 NOTE — ED PROVIDER NOTE - TEMPLATE, MLM
[FreeTextEntry1] : IUD inserted, Vaginal inclusion cyst, vestibulitis  -precautions reviewed -discussed options to remove cyst, or wait till next birth, not causing issues at this time -rv 4-6 wks for f/u 
Rash

## 2024-12-24 ENCOUNTER — EMERGENCY (EMERGENCY)
Facility: HOSPITAL | Age: 59
LOS: 1 days | Discharge: HOME | End: 2024-12-24
Attending: STUDENT IN AN ORGANIZED HEALTH CARE EDUCATION/TRAINING PROGRAM
Payer: MEDICAID

## 2024-12-24 VITALS
WEIGHT: 191.8 LBS | TEMPERATURE: 98 F | HEIGHT: 67 IN | SYSTOLIC BLOOD PRESSURE: 132 MMHG | RESPIRATION RATE: 18 BRPM | OXYGEN SATURATION: 99 % | DIASTOLIC BLOOD PRESSURE: 84 MMHG | HEART RATE: 89 BPM

## 2024-12-24 DIAGNOSIS — Z98.891 HISTORY OF UTERINE SCAR FROM PREVIOUS SURGERY: Chronic | ICD-10-CM

## 2024-12-24 DIAGNOSIS — Z98.82 BREAST IMPLANT STATUS: Chronic | ICD-10-CM

## 2024-12-24 LAB
BASOPHILS # BLD AUTO: 0.02 K/UL — SIGNIFICANT CHANGE UP (ref 0–0.2)
BASOPHILS NFR BLD AUTO: 0.3 % — SIGNIFICANT CHANGE UP (ref 0–2)
EOSINOPHIL # BLD AUTO: 0.14 K/UL — SIGNIFICANT CHANGE UP (ref 0–0.5)
EOSINOPHIL NFR BLD AUTO: 2 % — SIGNIFICANT CHANGE UP (ref 0–6)
HCG UR QL: NEGATIVE — SIGNIFICANT CHANGE UP
HCT VFR BLD CALC: 38.4 % — SIGNIFICANT CHANGE UP (ref 34.5–45)
HGB BLD-MCNC: 12.7 G/DL — SIGNIFICANT CHANGE UP (ref 11.5–15.5)
IMM GRANULOCYTES NFR BLD AUTO: 0.3 % — SIGNIFICANT CHANGE UP (ref 0–0.9)
LYMPHOCYTES # BLD AUTO: 2.54 K/UL — SIGNIFICANT CHANGE UP (ref 1–3.3)
LYMPHOCYTES # BLD AUTO: 35.6 % — SIGNIFICANT CHANGE UP (ref 13–44)
MCHC RBC-ENTMCNC: 28.3 PG — SIGNIFICANT CHANGE UP (ref 27–34)
MCHC RBC-ENTMCNC: 33.1 G/DL — SIGNIFICANT CHANGE UP (ref 32–36)
MCV RBC AUTO: 85.7 FL — SIGNIFICANT CHANGE UP (ref 80–100)
MONOCYTES # BLD AUTO: 0.63 K/UL — SIGNIFICANT CHANGE UP (ref 0–0.9)
MONOCYTES NFR BLD AUTO: 8.8 % — SIGNIFICANT CHANGE UP (ref 2–14)
NEUTROPHILS # BLD AUTO: 3.79 K/UL — SIGNIFICANT CHANGE UP (ref 1.8–7.4)
NEUTROPHILS NFR BLD AUTO: 53 % — SIGNIFICANT CHANGE UP (ref 43–77)
NRBC # BLD: 0 /100 WBCS — SIGNIFICANT CHANGE UP (ref 0–0)
PLATELET # BLD AUTO: 250 K/UL — SIGNIFICANT CHANGE UP (ref 150–400)
RBC # BLD: 4.48 M/UL — SIGNIFICANT CHANGE UP (ref 3.8–5.2)
RBC # FLD: 13.3 % — SIGNIFICANT CHANGE UP (ref 10.3–14.5)
WBC # BLD: 7.14 K/UL — SIGNIFICANT CHANGE UP (ref 3.8–10.5)
WBC # FLD AUTO: 7.14 K/UL — SIGNIFICANT CHANGE UP (ref 3.8–10.5)

## 2024-12-24 PROCEDURE — 72170 X-RAY EXAM OF PELVIS: CPT | Mod: 26

## 2024-12-24 PROCEDURE — 36415 COLL VENOUS BLD VENIPUNCTURE: CPT

## 2024-12-24 PROCEDURE — 70450 CT HEAD/BRAIN W/O DYE: CPT | Mod: MC

## 2024-12-24 PROCEDURE — 71046 X-RAY EXAM CHEST 2 VIEWS: CPT | Mod: 26

## 2024-12-24 PROCEDURE — 73030 X-RAY EXAM OF SHOULDER: CPT

## 2024-12-24 PROCEDURE — 70450 CT HEAD/BRAIN W/O DYE: CPT | Mod: 26,MC

## 2024-12-24 PROCEDURE — 73030 X-RAY EXAM OF SHOULDER: CPT | Mod: 26,LT

## 2024-12-24 PROCEDURE — 72170 X-RAY EXAM OF PELVIS: CPT

## 2024-12-24 PROCEDURE — 71046 X-RAY EXAM CHEST 2 VIEWS: CPT

## 2024-12-24 PROCEDURE — 99285 EMERGENCY DEPT VISIT HI MDM: CPT

## 2024-12-24 PROCEDURE — 80053 COMPREHEN METABOLIC PANEL: CPT

## 2024-12-24 PROCEDURE — 85025 COMPLETE CBC W/AUTO DIFF WBC: CPT

## 2024-12-24 PROCEDURE — 99284 EMERGENCY DEPT VISIT MOD MDM: CPT | Mod: 25

## 2024-12-24 PROCEDURE — 83735 ASSAY OF MAGNESIUM: CPT

## 2024-12-24 PROCEDURE — 81025 URINE PREGNANCY TEST: CPT

## 2024-12-24 RX ORDER — ONDANSETRON HYDROCHLORIDE 4 MG/1
4 TABLET, FILM COATED ORAL ONCE
Refills: 0 | Status: COMPLETED | OUTPATIENT
Start: 2024-12-24 | End: 2024-12-24

## 2024-12-24 RX ORDER — ONDANSETRON HYDROCHLORIDE 4 MG/1
4 TABLET, FILM COATED ORAL ONCE
Refills: 0 | Status: DISCONTINUED | OUTPATIENT
Start: 2024-12-24 | End: 2024-12-24

## 2024-12-24 RX ORDER — LIDOCAINE 40 MG/G
1 CREAM TOPICAL
Qty: 1 | Refills: 0
Start: 2024-12-24 | End: 2024-12-28

## 2024-12-24 RX ORDER — OXYCODONE HYDROCHLORIDE 30 MG/1
1 TABLET ORAL
Qty: 6 | Refills: 0
Start: 2024-12-24 | End: 2024-12-25

## 2024-12-24 RX ORDER — LIDOCAINE 40 MG/G
1 CREAM TOPICAL ONCE
Refills: 0 | Status: COMPLETED | OUTPATIENT
Start: 2024-12-24 | End: 2024-12-24

## 2024-12-24 RX ORDER — ACETAMINOPHEN 500MG 500 MG/1
975 TABLET, COATED ORAL ONCE
Refills: 0 | Status: COMPLETED | OUTPATIENT
Start: 2024-12-24 | End: 2024-12-24

## 2024-12-24 RX ORDER — SODIUM CHLORIDE 9 MG/ML
1000 INJECTION, SOLUTION INTRAMUSCULAR; INTRAVENOUS; SUBCUTANEOUS ONCE
Refills: 0 | Status: COMPLETED | OUTPATIENT
Start: 2024-12-24 | End: 2024-12-24

## 2024-12-24 RX ORDER — OXYCODONE HYDROCHLORIDE 30 MG/1
5 TABLET ORAL ONCE
Refills: 0 | Status: DISCONTINUED | OUTPATIENT
Start: 2024-12-24 | End: 2024-12-24

## 2024-12-24 RX ADMIN — OXYCODONE HYDROCHLORIDE 5 MILLIGRAM(S): 30 TABLET ORAL at 22:02

## 2024-12-24 RX ADMIN — SODIUM CHLORIDE 1000 MILLILITER(S): 9 INJECTION, SOLUTION INTRAMUSCULAR; INTRAVENOUS; SUBCUTANEOUS at 23:47

## 2024-12-24 RX ADMIN — ONDANSETRON HYDROCHLORIDE 4 MILLIGRAM(S): 4 TABLET, FILM COATED ORAL at 23:47

## 2024-12-24 RX ADMIN — LIDOCAINE 1 PATCH: 40 CREAM TOPICAL at 22:02

## 2024-12-24 RX ADMIN — ACETAMINOPHEN 500MG 975 MILLIGRAM(S): 500 TABLET, COATED ORAL at 22:02

## 2024-12-24 NOTE — ED PROVIDER NOTE - NS ED ROS FT
Constitutional: no fevers, chills  HEENT: +HA, vision changes, rhinorrhea, sore throat  Cardiac: no chest pain, palpitations  Respiratory: no SOB, cough or hemoptysis  GI: no n/v/d/c, abd pain, bloody or dark stools  : no dysuria, frequency, or hematuria  MSK: + joint pain, neck pain, back pain  Skin: no rashes, jaundice, pruritis  Neuro: no numbness/tingling, weakness, unsteady gait  ROS otherwise neg except per MDM
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
## 2024-12-24 NOTE — ED PROVIDER NOTE - PHYSICAL EXAMINATION
HEENT: NCAT PERRL no facial instability or newly missing or loose dentition. no proptosis. trachea midline. no CSF otorrhea or rhinorrhea. no epistaxis or septal hematoma. no palpable crepitance in the neck.   chest: RRR no murmurs. no chest wall tenderness or crepitance, flail segments, ecchymosis. CTAB.   abdomen/: non-distended. no ttp or rebound. no seatbelt sign. MALE no blood at the urethral meatus. no scrotal ecchymosis.   MSK: no midline spinal tenderness or step off. pelvis stable. no extremity deformity + bilateral patellar tenderness with intact extensor mechanism. ttp L shoulder with limited active rom, no other bony ttp.    skin: no ecchymosis or lacerations noted.   neuro: AAO4 moving all extremities. sensation intact.   C-COLLAR: clinically cleared

## 2024-12-24 NOTE — ED ADULT NURSE NOTE - CAS EDN DISCHARGE ASSESSMENT
Class III - visualization of the soft palate and the base of the uvula Alert and oriented to person, place and time/Patient baseline mental status/Awake
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
## 2024-12-24 NOTE — ED ADULT NURSE NOTE - NSFALLRISKINTERV_ED_ALL_ED

## 2024-12-24 NOTE — ED ADULT TRIAGE NOTE - HEIGHT IN INCHES
Patient is calling because he needs to cancel his appointment at [facility] with Sathish on 9/7/2018, he would like to reschedule. Please give him a call back.
Pt has been called and R/S  Pt is out of town that day
7
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
## 2024-12-24 NOTE — ED PROVIDER NOTE - PATIENT PORTAL LINK FT
You can access the FollowMyHealth Patient Portal offered by [facility] by registering at the following website: [email or web address]. By joining [employer]’s FollowMyHealth portal, you will also be able to view your health information using other applications (apps) compatible with our system. You can access the FollowMyHealth Patient Portal offered by [facility] by registering at the following website: [email or web address]. By joining [employer]’s FollowMyHealth portal, you will also be able to view your health information using other applications (apps) compatible with our system.

## 2024-12-24 NOTE — ED PROVIDER NOTE - NSFOLLOWUPINSTRUCTIONS_ED_ALL_ED_FT
Lo atendieron en el Departamento de Emergencias por shane lesión.     1) Avanzar en la actividad según se tolere.   2) Continúe con todos los medicamentos recetados anteriormente según las indicaciones.    3) Olesya un seguimiento con campbell médico de atención primaria en 24 a 48 horas; tome copias de kt resultados.    4) Regresar al Departamento de Emergencias si los síntomas empeoran o persisten, y/o CUALQUIER SÍNTOMA NUEVO O PREOCUPANTE.    puede aquilino tylenol/acetaminofen 975 mg cada 6 horas para el dolor (no exceda los 1000 mg por dosis o los 4000 mg por día). asegúrese de que cualquier otro medicamento que esté tomando no contenga tylenol/acetaminofén. Si otros medicamentos incluyen tylenol/acetaminofén, asegúrese de incluirlo en kt cálculos de dosis única y dosis diaria.     También puedes aquilino motrin/ibuprofeno 600 mg para el dolor.     El acetaminofén/tylenol se puede aquilino al mismo tiempo que motrin/ibuprofeno para un anuj alivio del dolor o con 3 horas de diferencia para un alivio continuo del dolor.     Puedes usar parches de lidocaína sin receta. Estos se pueden usar mike 12 horas al día, shane vez al día. no lo coloque sobre piel infectada o abierta.      You were seen in the Emergency Department for an injury.     1) Advance activity as tolerated.   2) Continue all previously prescribed medications as directed.    3) Follow up with your primary care physician in 24-48 hours - take copies of your results.    4) Return to the Emergency Department for worsening or persistent symptoms, and/or ANY NEW OR CONCERNING SYMPTOMS.    you can take tylenol/acetaminophen 975mg every 6 hours for pain (do not exceed 1000mg per dose or 4000mg per day). be sure that any other medications you are taking do not contain tylenol/acetaminophen. if other medications do include tylenol/acetaminophen, be sure to include this in your calculations of one time dose and daily dose.     you can also take motrin/ibuprofen 600mg for pain.     acetaminophen/tylenol can be taken at the same time as motrin/ibuprofen for maximum pain relief or 3 hours apart for continuous pain relief.     you can use lidocaine patches over the counter. these can be worn for 12 hours a day, once a day. do not place over infected or open skin.

## 2024-12-24 NOTE — ED ADULT TRIAGE NOTE - CHIEF COMPLAINT QUOTE
Patient reports fell at home at 1030 AM today. Patient reports left sided neck pain, left arm and hand pain, left upper back, and right leg pain. Patient reports she hit the back of her had, reports blurred vision for at times which she denies at this time, positive LOC, and denies taking blood thinners. Patient ambulates with steady gaits. While in Labor & Delivery

## 2024-12-24 NOTE — ED ADULT NURSE NOTE - TEMPLATE LIST FOR HEAD TO TOE ASSESSMENT
no loss of consciousness, no gait abnormality, no headache, no sensory deficits, and no weakness.
Fall

## 2024-12-24 NOTE — ED ADULT NURSE NOTE - OBJECTIVE STATEMENT
Patient reports fell at home at 1030 AM today. Patient reports left sided neck pain, left arm and hand pain, left upper back, and right leg pain. Patient reports she hit the back of her had, reports blurred vision for at times which she denies at this time, positive LOC, and denies taking blood thinners. Patient ambulates with steady gait

## 2024-12-25 VITALS
OXYGEN SATURATION: 100 % | HEART RATE: 69 BPM | RESPIRATION RATE: 18 BRPM | DIASTOLIC BLOOD PRESSURE: 58 MMHG | SYSTOLIC BLOOD PRESSURE: 112 MMHG | TEMPERATURE: 98 F

## 2024-12-25 LAB
ALBUMIN SERPL ELPH-MCNC: 3.6 G/DL — SIGNIFICANT CHANGE UP (ref 3.5–5)
ALP SERPL-CCNC: 130 U/L — HIGH (ref 40–120)
ALT FLD-CCNC: 17 U/L DA — SIGNIFICANT CHANGE UP (ref 10–60)
ANION GAP SERPL CALC-SCNC: 4 MMOL/L — LOW (ref 5–17)
AST SERPL-CCNC: 15 U/L — SIGNIFICANT CHANGE UP (ref 10–40)
BILIRUB SERPL-MCNC: 0.3 MG/DL — SIGNIFICANT CHANGE UP (ref 0.2–1.2)
BUN SERPL-MCNC: 15 MG/DL — SIGNIFICANT CHANGE UP (ref 7–18)
CALCIUM SERPL-MCNC: 8.5 MG/DL — SIGNIFICANT CHANGE UP (ref 8.4–10.5)
CHLORIDE SERPL-SCNC: 109 MMOL/L — HIGH (ref 96–108)
CO2 SERPL-SCNC: 27 MMOL/L — SIGNIFICANT CHANGE UP (ref 22–31)
CREAT SERPL-MCNC: 0.59 MG/DL — SIGNIFICANT CHANGE UP (ref 0.5–1.3)
EGFR: 105 ML/MIN/1.73M2 — SIGNIFICANT CHANGE UP
GLUCOSE SERPL-MCNC: 125 MG/DL — HIGH (ref 70–99)
MAGNESIUM SERPL-MCNC: 2.4 MG/DL — SIGNIFICANT CHANGE UP (ref 1.6–2.6)
POTASSIUM SERPL-MCNC: 3.8 MMOL/L — SIGNIFICANT CHANGE UP (ref 3.5–5.3)
POTASSIUM SERPL-SCNC: 3.8 MMOL/L — SIGNIFICANT CHANGE UP (ref 3.5–5.3)
PROT SERPL-MCNC: 7.2 G/DL — SIGNIFICANT CHANGE UP (ref 6–8.3)
SODIUM SERPL-SCNC: 140 MMOL/L — SIGNIFICANT CHANGE UP (ref 135–145)

## 2024-12-25 RX ORDER — ONDANSETRON HYDROCHLORIDE 4 MG/1
1 TABLET, FILM COATED ORAL
Qty: 15 | Refills: 0
Start: 2024-12-25 | End: 2024-12-29